# Patient Record
Sex: FEMALE | Race: WHITE | NOT HISPANIC OR LATINO
[De-identification: names, ages, dates, MRNs, and addresses within clinical notes are randomized per-mention and may not be internally consistent; named-entity substitution may affect disease eponyms.]

---

## 2022-05-05 ENCOUNTER — NON-APPOINTMENT (OUTPATIENT)
Age: 52
End: 2022-05-05

## 2022-05-06 PROBLEM — Z00.00 ENCOUNTER FOR PREVENTIVE HEALTH EXAMINATION: Status: ACTIVE | Noted: 2022-05-06

## 2022-05-10 ENCOUNTER — NON-APPOINTMENT (OUTPATIENT)
Age: 52
End: 2022-05-10

## 2022-05-10 ENCOUNTER — APPOINTMENT (OUTPATIENT)
Dept: GYNECOLOGIC ONCOLOGY | Facility: CLINIC | Age: 52
End: 2022-05-10
Payer: MEDICAID

## 2022-05-10 VITALS
HEIGHT: 63 IN | BODY MASS INDEX: 29.77 KG/M2 | DIASTOLIC BLOOD PRESSURE: 109 MMHG | HEART RATE: 96 BPM | WEIGHT: 168 LBS | SYSTOLIC BLOOD PRESSURE: 181 MMHG

## 2022-05-10 DIAGNOSIS — R10.9 UNSPECIFIED ABDOMINAL PAIN: ICD-10-CM

## 2022-05-10 DIAGNOSIS — Z78.9 OTHER SPECIFIED HEALTH STATUS: ICD-10-CM

## 2022-05-10 DIAGNOSIS — R10.30 LOWER ABDOMINAL PAIN, UNSPECIFIED: ICD-10-CM

## 2022-05-10 DIAGNOSIS — Z76.89 PERSONS ENCOUNTERING HEALTH SERVICES IN OTHER SPECIFIED CIRCUMSTANCES: ICD-10-CM

## 2022-05-10 DIAGNOSIS — Z01.818 ENCOUNTER FOR OTHER PREPROCEDURAL EXAMINATION: ICD-10-CM

## 2022-05-10 DIAGNOSIS — Z80.9 FAMILY HISTORY OF MALIGNANT NEOPLASM, UNSPECIFIED: ICD-10-CM

## 2022-05-10 PROCEDURE — 99205 OFFICE O/P NEW HI 60 MIN: CPT

## 2022-05-13 ENCOUNTER — OUTPATIENT (OUTPATIENT)
Dept: OUTPATIENT SERVICES | Facility: HOSPITAL | Age: 52
LOS: 1 days | End: 2022-05-13
Payer: MEDICAID

## 2022-05-13 VITALS
WEIGHT: 167.99 LBS | TEMPERATURE: 98 F | HEIGHT: 62 IN | OXYGEN SATURATION: 98 % | RESPIRATION RATE: 15 BRPM | DIASTOLIC BLOOD PRESSURE: 80 MMHG | HEART RATE: 88 BPM | SYSTOLIC BLOOD PRESSURE: 130 MMHG

## 2022-05-13 DIAGNOSIS — N94.89 OTHER SPECIFIED CONDITIONS ASSOCIATED WITH FEMALE GENITAL ORGANS AND MENSTRUAL CYCLE: ICD-10-CM

## 2022-05-13 DIAGNOSIS — F17.200 NICOTINE DEPENDENCE, UNSPECIFIED, UNCOMPLICATED: ICD-10-CM

## 2022-05-13 DIAGNOSIS — Z78.9 OTHER SPECIFIED HEALTH STATUS: Chronic | ICD-10-CM

## 2022-05-13 LAB
A1C WITH ESTIMATED AVERAGE GLUCOSE RESULT: 5.3 % — SIGNIFICANT CHANGE UP (ref 4–5.6)
ALBUMIN SERPL ELPH-MCNC: 4.3 G/DL — SIGNIFICANT CHANGE UP (ref 3.3–5)
ALP SERPL-CCNC: 90 U/L — SIGNIFICANT CHANGE UP (ref 40–120)
ALT FLD-CCNC: 39 U/L — HIGH (ref 4–33)
ANION GAP SERPL CALC-SCNC: 11 MMOL/L — SIGNIFICANT CHANGE UP (ref 7–14)
APPEARANCE UR: CLEAR — SIGNIFICANT CHANGE UP
AST SERPL-CCNC: 32 U/L — SIGNIFICANT CHANGE UP (ref 4–32)
BILIRUB SERPL-MCNC: 0.4 MG/DL — SIGNIFICANT CHANGE UP (ref 0.2–1.2)
BILIRUB UR-MCNC: NEGATIVE — SIGNIFICANT CHANGE UP
BLD GP AB SCN SERPL QL: NEGATIVE — SIGNIFICANT CHANGE UP
BUN SERPL-MCNC: 6 MG/DL — LOW (ref 7–23)
CALCIUM SERPL-MCNC: 9.2 MG/DL — SIGNIFICANT CHANGE UP (ref 8.4–10.5)
CHLORIDE SERPL-SCNC: 104 MMOL/L — SIGNIFICANT CHANGE UP (ref 98–107)
CO2 SERPL-SCNC: 24 MMOL/L — SIGNIFICANT CHANGE UP (ref 22–31)
COLOR SPEC: COLORLESS — SIGNIFICANT CHANGE UP
CREAT SERPL-MCNC: 0.78 MG/DL — SIGNIFICANT CHANGE UP (ref 0.5–1.3)
DIFF PNL FLD: NEGATIVE — SIGNIFICANT CHANGE UP
EGFR: 93 ML/MIN/1.73M2 — SIGNIFICANT CHANGE UP
ESTIMATED AVERAGE GLUCOSE: 105 — SIGNIFICANT CHANGE UP
GLUCOSE SERPL-MCNC: 78 MG/DL — SIGNIFICANT CHANGE UP (ref 70–99)
GLUCOSE UR QL: NEGATIVE — SIGNIFICANT CHANGE UP
HCG UR QL: NEGATIVE — SIGNIFICANT CHANGE UP
HCT VFR BLD CALC: 44.7 % — SIGNIFICANT CHANGE UP (ref 34.5–45)
HGB BLD-MCNC: 15.2 G/DL — SIGNIFICANT CHANGE UP (ref 11.5–15.5)
KETONES UR-MCNC: NEGATIVE — SIGNIFICANT CHANGE UP
LEUKOCYTE ESTERASE UR-ACNC: NEGATIVE — SIGNIFICANT CHANGE UP
MCHC RBC-ENTMCNC: 31.5 PG — SIGNIFICANT CHANGE UP (ref 27–34)
MCHC RBC-ENTMCNC: 34 GM/DL — SIGNIFICANT CHANGE UP (ref 32–36)
MCV RBC AUTO: 92.7 FL — SIGNIFICANT CHANGE UP (ref 80–100)
NITRITE UR-MCNC: NEGATIVE — SIGNIFICANT CHANGE UP
NRBC # BLD: 0 /100 WBCS — SIGNIFICANT CHANGE UP
NRBC # FLD: 0 K/UL — SIGNIFICANT CHANGE UP
PH UR: 6.5 — SIGNIFICANT CHANGE UP (ref 5–8)
PLATELET # BLD AUTO: 157 K/UL — SIGNIFICANT CHANGE UP (ref 150–400)
POTASSIUM SERPL-MCNC: 3.7 MMOL/L — SIGNIFICANT CHANGE UP (ref 3.5–5.3)
POTASSIUM SERPL-SCNC: 3.7 MMOL/L — SIGNIFICANT CHANGE UP (ref 3.5–5.3)
PROT SERPL-MCNC: 7.2 G/DL — SIGNIFICANT CHANGE UP (ref 6–8.3)
PROT UR-MCNC: NEGATIVE — SIGNIFICANT CHANGE UP
RBC # BLD: 4.82 M/UL — SIGNIFICANT CHANGE UP (ref 3.8–5.2)
RBC # FLD: 12.7 % — SIGNIFICANT CHANGE UP (ref 10.3–14.5)
RH IG SCN BLD-IMP: POSITIVE — SIGNIFICANT CHANGE UP
SODIUM SERPL-SCNC: 139 MMOL/L — SIGNIFICANT CHANGE UP (ref 135–145)
SP GR SPEC: 1 — SIGNIFICANT CHANGE UP (ref 1–1.05)
UROBILINOGEN FLD QL: SIGNIFICANT CHANGE UP
WBC # BLD: 9.26 K/UL — SIGNIFICANT CHANGE UP (ref 3.8–10.5)
WBC # FLD AUTO: 9.26 K/UL — SIGNIFICANT CHANGE UP (ref 3.8–10.5)

## 2022-05-13 PROCEDURE — 93010 ELECTROCARDIOGRAM REPORT: CPT

## 2022-05-13 NOTE — H&P PST ADULT - HISTORY OF PRESENT ILLNESS
51 year old female with no pertinent PMH, current smoker, presents to PST with pre op diagnosis of adnexal mass, for pre op evaluation prior to scheduled surgery- Robotic right salpingo oophorectomy, left salpingectomy, dilation curettage, possible left oophorectomy, Hysterectomy, staging, Laparotomy, cystoscopy with Dr Cano.

## 2022-05-13 NOTE — H&P PST ADULT - PROBLEM SELECTOR PLAN 1
Patient is tentatively scheduled for Robotic right salpingo oophorectomy, left salpingectomy, dilation curettage, possible left oophorectomy, Hysterectomy, staging, Laparotomy, cystoscopy with Dr Cano on 05/20/22.    Pre-op instructions provided. Pt given verbal and written instructions with teach back on chlorhexidine wash and pepcid. Pt verbalized understanding with return demonstration.    Pt strongly advised to follow up with surgeon to discuss COVID testing requirements prior to procedure.

## 2022-05-13 NOTE — H&P PST ADULT - NSICDXFAMILYHX_GEN_ALL_CORE_FT
FAMILY HISTORY:  Mother  Still living? Unknown  Family history of skin cancer, Age at diagnosis: Age Unknown    Sibling  Still living? Yes, Estimated age: Age Unknown  Family history of skin cancer, Age at diagnosis: Age Unknown

## 2022-05-13 NOTE — H&P PST ADULT - ASSESSMENT
pre op diagnosis of adnexal mass, for pre op evaluation prior to scheduled surgery- Robotic right salpingo oophorectomy, left salpingectomy, dilation curettage, possible left oophorectomy, Hysterectomy, staging, Laparotomy, cystoscopy with Dr Cano.

## 2022-05-13 NOTE — H&P PST ADULT - NEGATIVE ENMT SYMPTOMS
no hearing difficulty/no ear pain/no tinnitus/no vertigo/no sinus symptoms/no nasal congestion/no post-nasal discharge

## 2022-05-14 LAB
CULTURE RESULTS: SIGNIFICANT CHANGE UP
SPECIMEN SOURCE: SIGNIFICANT CHANGE UP

## 2022-05-17 ENCOUNTER — RESULT REVIEW (OUTPATIENT)
Age: 52
End: 2022-05-17

## 2022-05-18 ENCOUNTER — OUTPATIENT (OUTPATIENT)
Dept: OUTPATIENT SERVICES | Facility: HOSPITAL | Age: 52
LOS: 1 days | End: 2022-05-18
Payer: SELF-PAY

## 2022-05-18 ENCOUNTER — APPOINTMENT (OUTPATIENT)
Dept: CT IMAGING | Facility: CLINIC | Age: 52
End: 2022-05-18
Payer: SELF-PAY

## 2022-05-18 DIAGNOSIS — R10.9 UNSPECIFIED ABDOMINAL PAIN: ICD-10-CM

## 2022-05-18 DIAGNOSIS — N94.89 OTHER SPECIFIED CONDITIONS ASSOCIATED WITH FEMALE GENITAL ORGANS AND MENSTRUAL CYCLE: ICD-10-CM

## 2022-05-18 DIAGNOSIS — Z78.9 OTHER SPECIFIED HEALTH STATUS: Chronic | ICD-10-CM

## 2022-05-18 PROBLEM — Z87.09 PERSONAL HISTORY OF OTHER DISEASES OF THE RESPIRATORY SYSTEM: Chronic | Status: ACTIVE | Noted: 2022-05-13

## 2022-05-18 PROBLEM — F17.200 NICOTINE DEPENDENCE, UNSPECIFIED, UNCOMPLICATED: Chronic | Status: ACTIVE | Noted: 2022-05-13

## 2022-05-18 LAB — SARS-COV-2 N GENE NPH QL NAA+PROBE: NOT DETECTED

## 2022-05-18 PROCEDURE — 74160 CT ABDOMEN W/CONTRAST: CPT

## 2022-05-18 PROCEDURE — 74160 CT ABDOMEN W/CONTRAST: CPT | Mod: 26

## 2022-05-19 ENCOUNTER — TRANSCRIPTION ENCOUNTER (OUTPATIENT)
Age: 52
End: 2022-05-19

## 2022-05-20 ENCOUNTER — RESULT REVIEW (OUTPATIENT)
Age: 52
End: 2022-05-20

## 2022-05-20 ENCOUNTER — APPOINTMENT (OUTPATIENT)
Dept: GYNECOLOGIC ONCOLOGY | Facility: HOSPITAL | Age: 52
End: 2022-05-20

## 2022-05-20 ENCOUNTER — TRANSCRIPTION ENCOUNTER (OUTPATIENT)
Age: 52
End: 2022-05-20

## 2022-05-20 ENCOUNTER — OUTPATIENT (OUTPATIENT)
Dept: OUTPATIENT SERVICES | Facility: HOSPITAL | Age: 52
LOS: 1 days | Discharge: HOME | End: 2022-05-20
Payer: MEDICAID

## 2022-05-20 VITALS
OXYGEN SATURATION: 96 % | SYSTOLIC BLOOD PRESSURE: 155 MMHG | HEART RATE: 78 BPM | RESPIRATION RATE: 18 BRPM | TEMPERATURE: 98 F | WEIGHT: 167.99 LBS | DIASTOLIC BLOOD PRESSURE: 66 MMHG | HEIGHT: 62 IN

## 2022-05-20 VITALS
OXYGEN SATURATION: 95 % | RESPIRATION RATE: 16 BRPM | SYSTOLIC BLOOD PRESSURE: 126 MMHG | HEART RATE: 87 BPM | DIASTOLIC BLOOD PRESSURE: 71 MMHG | TEMPERATURE: 100 F

## 2022-05-20 DIAGNOSIS — N94.89 OTHER SPECIFIED CONDITIONS ASSOCIATED WITH FEMALE GENITAL ORGANS AND MENSTRUAL CYCLE: ICD-10-CM

## 2022-05-20 DIAGNOSIS — Z78.9 OTHER SPECIFIED HEALTH STATUS: Chronic | ICD-10-CM

## 2022-05-20 LAB — HCG UR QL: NEGATIVE — SIGNIFICANT CHANGE UP

## 2022-05-20 PROCEDURE — 58661 LAPAROSCOPY REMOVE ADNEXA: CPT

## 2022-05-20 PROCEDURE — 88305 TISSUE EXAM BY PATHOLOGIST: CPT | Mod: 26

## 2022-05-20 PROCEDURE — S2900 ROBOTIC SURGICAL SYSTEM: CPT | Mod: NC

## 2022-05-20 PROCEDURE — 88305 TISSUE EXAM BY PATHOLOGIST: CPT | Mod: 26,59

## 2022-05-20 PROCEDURE — 88331 PATH CONSLTJ SURG 1 BLK 1SPC: CPT | Mod: 26

## 2022-05-20 PROCEDURE — 58120 DILATION AND CURETTAGE: CPT

## 2022-05-20 PROCEDURE — 88307 TISSUE EXAM BY PATHOLOGIST: CPT | Mod: 26

## 2022-05-20 PROCEDURE — 88112 CYTOPATH CELL ENHANCE TECH: CPT | Mod: 26

## 2022-05-20 RX ORDER — FENTANYL CITRATE 50 UG/ML
25 INJECTION INTRAVENOUS
Refills: 0 | Status: DISCONTINUED | OUTPATIENT
Start: 2022-05-20 | End: 2022-05-20

## 2022-05-20 RX ORDER — OXYCODONE HYDROCHLORIDE 5 MG/1
10 TABLET ORAL ONCE
Refills: 0 | Status: DISCONTINUED | OUTPATIENT
Start: 2022-05-20 | End: 2022-05-20

## 2022-05-20 RX ORDER — ONDANSETRON 8 MG/1
4 TABLET, FILM COATED ORAL ONCE
Refills: 0 | Status: DISCONTINUED | OUTPATIENT
Start: 2022-05-20 | End: 2022-06-03

## 2022-05-20 RX ORDER — FENTANYL CITRATE 50 UG/ML
50 INJECTION INTRAVENOUS
Refills: 0 | Status: DISCONTINUED | OUTPATIENT
Start: 2022-05-20 | End: 2022-05-20

## 2022-05-20 RX ORDER — OXYCODONE HYDROCHLORIDE 5 MG/1
5 TABLET ORAL ONCE
Refills: 0 | Status: DISCONTINUED | OUTPATIENT
Start: 2022-05-20 | End: 2022-05-20

## 2022-05-20 RX ORDER — OXYCODONE HYDROCHLORIDE 5 MG/1
1 TABLET ORAL
Qty: 5 | Refills: 0
Start: 2022-05-20

## 2022-05-20 RX ORDER — SODIUM CHLORIDE 9 MG/ML
1000 INJECTION, SOLUTION INTRAVENOUS
Refills: 0 | Status: DISCONTINUED | OUTPATIENT
Start: 2022-05-20 | End: 2022-06-03

## 2022-05-20 RX ADMIN — SODIUM CHLORIDE 125 MILLILITER(S): 9 INJECTION, SOLUTION INTRAVENOUS at 12:39

## 2022-05-20 NOTE — ASU DISCHARGE PLAN (ADULT/PEDIATRIC) - NURSING INSTRUCTIONS
You received IV Tylenol for pain management at 10:00AM. Please DO NOT take any Tylenol (Acetaminophen) containing products, such as Vicodin, Percocet, Excedrin, and cold medications for the next 6 hours (until 4:00PMPM). DO NOT TAKE MORE THAN 3000 MG OF TYLENOL in a 24 hour period. You received IV Toradol for pain management at 12:00PM. Please DO NOT take Motrin/Ibuprofen/Advil/Aleve/NSAIDs (Non-Steroidal Anti-Inflammatory Drugs) for the next 6 hours (until 6:00PM).

## 2022-05-20 NOTE — BRIEF OPERATIVE NOTE - NSICDXBRIEFPROCEDURE_GEN_ALL_CORE_FT
PROCEDURES:  Robot-assisted bilateral salpingo-oophorectomy 20-May-2022 12:19:12  Frankel, Robyn D&ESTELA (dilatation and curettage, scraping of uterus) 20-May-2022 12:19:20  Frankel, Robyn

## 2022-05-20 NOTE — PROGRESS NOTE ADULT - SUBJECTIVE AND OBJECTIVE BOX
GYNECOLOGIC ONCOLOGY PA POST OP  NOTE    Pt seen and examined at bedside. Pain well-controlled. Patient denies headache, dizziness, nausea, vomiting, chest pain and sob. Patient is tolerating sips of water. Hanks removed in OR. Nurse endorses that patient had an episode of incontinence upon first arriving in PACU.     OBJECTIVE:     VITALS:  T(F): 98.1 (05-20-22 @ 12:15), Max: 98.1 (05-20-22 @ 12:15)  HR: 93 (05-20-22 @ 13:15) (78 - 94)  BP: 117/75 (05-20-22 @ 13:15) (104/61 - 155/66)  RR: 15 (05-20-22 @ 13:15) (12 - 18)  SpO2: 96% (05-20-22 @ 13:15) (95% - 97%)  Wt(kg): --    I&O's Summary    20 May 2022 07:01  -  20 May 2022 13:36  --------------------------------------------------------  IN: 300 mL / OUT: 1 mL / NET: 299 mL        MEDICATIONS  (STANDING):  lactated ringers. 1000 milliLiter(s) (125 mL/Hr) IV Continuous <Continuous>    MEDICATIONS  (PRN):  fentaNYL    Injectable 25 MICROGram(s) IV Push every 5 minutes PRN Moderate Pain (4 - 6)  fentaNYL    Injectable 50 MICROGram(s) IV Push every 5 minutes PRN Severe Pain (7 - 10)  ondansetron Injectable 4 milliGRAM(s) IV Push once PRN Nausea and/or Vomiting  oxyCODONE    IR 5 milliGRAM(s) Oral once PRN Moderate Pain (4 - 6)  oxyCODONE    IR 10 milliGRAM(s) Oral once PRN Severe Pain (7 - 10)      Physical Exam:  Constitutional: NAD  Pulmonary: clear to auscultation bilaterally   Cardiovascular: Regular rate and rhythm   Abdomen: soft, non-distended, appropriately tender, scope sites C/D/I  Extremities: no lower extremity edema or calve tenderness

## 2022-05-20 NOTE — ASU DISCHARGE PLAN (ADULT/PEDIATRIC) - NS MD DC FALL RISK RISK
For information on Fall & Injury Prevention, visit: [email or web address] OR  [email or web address] OR  https://www.cdc.gov/steadi/patient.html

## 2022-05-20 NOTE — ASU PREOP CHECKLIST - SELECT TESTS ORDERED
CBC/Type and Cross/Type and Screen/UCG/COVID-19 fs 110/CBC/Type and Cross/Type and Screen/UCG/POCT Blood Glucose/COVID-19

## 2022-05-20 NOTE — ASU DISCHARGE PLAN (ADULT/PEDIATRIC) - CARE PROVIDER_API CALL
Em Cano)  Gynecologic Oncology; Obstetrics and Gynecology  [address] [place] [address]  Phone: [phone]  Fax: [phone]  Established Patient  Follow Up Time: 2 weeks

## 2022-05-20 NOTE — PROGRESS NOTE ADULT - ASSESSMENT
52 yo female s/p Robot-assisted bilateral salpingo-oophorectomy and D&C (frozen: benign) in stable condition  -LR@125  -Regular diet  -DTV @7pm  -pain management prn  -VS per routine  - discharge home once ambulating and voiding without difficulty, tolerating po diet  -discharge instructions reviewed  -F/U with Dr. Cano in 2 weeks    Titi CASEYC  #[id number]

## 2022-05-20 NOTE — BRIEF OPERATIVE NOTE - OPERATION/FINDINGS
EUA: normal external genitalia; Small mobile uterus. Right mobile adnexal mass palpated in posterior cul de sac.   Lsc: Atraumatic entry sites. 6cm fibrous appearing right ovarian mass and multiple 2-3cm fibrous appearing left ovarian masses encompassing entirety of normal ovarian tissue with filmy adhesions to uterosacral ligaments on left. Grossly normal appearing uterus and bilateral fallopian tubes. Abdominopelvic survey wnl, include liver, stomach, large and small bowel, appendix, omentum, bladder.

## 2022-05-23 ENCOUNTER — NON-APPOINTMENT (OUTPATIENT)
Age: 52
End: 2022-05-23

## 2022-05-23 LAB — GLUCOSE BLDC GLUCOMTR-MCNC: 110 MG/DL — HIGH (ref 70–99)

## 2022-05-24 ENCOUNTER — NON-APPOINTMENT (OUTPATIENT)
Age: 52
End: 2022-05-24

## 2022-05-24 LAB — NON-GYNECOLOGICAL CYTOLOGY STUDY: SIGNIFICANT CHANGE UP

## 2022-05-31 ENCOUNTER — APPOINTMENT (OUTPATIENT)
Dept: GYNECOLOGIC ONCOLOGY | Facility: CLINIC | Age: 52
End: 2022-05-31
Payer: MEDICAID

## 2022-05-31 VITALS — HEART RATE: 92 BPM | SYSTOLIC BLOOD PRESSURE: 147 MMHG | DIASTOLIC BLOOD PRESSURE: 107 MMHG

## 2022-05-31 DIAGNOSIS — N94.89 OTHER SPECIFIED CONDITIONS ASSOCIATED WITH FEMALE GENITAL ORGANS AND MENSTRUAL CYCLE: ICD-10-CM

## 2022-05-31 LAB — SURGICAL PATHOLOGY STUDY: SIGNIFICANT CHANGE UP

## 2022-05-31 PROCEDURE — 99024 POSTOP FOLLOW-UP VISIT: CPT

## 2022-05-31 RX ORDER — OXYCODONE 5 MG/1
5 TABLET ORAL
Qty: 5 | Refills: 0 | Status: COMPLETED | COMMUNITY
Start: 2022-05-20

## 2023-10-26 ENCOUNTER — APPOINTMENT (OUTPATIENT)
Dept: THORACIC SURGERY | Facility: CLINIC | Age: 53
End: 2023-10-26
Payer: MEDICAID

## 2023-10-26 VITALS
BODY MASS INDEX: 30.48 KG/M2 | TEMPERATURE: 97.9 F | OXYGEN SATURATION: 95 % | SYSTOLIC BLOOD PRESSURE: 144 MMHG | HEART RATE: 86 BPM | RESPIRATION RATE: 16 BRPM | DIASTOLIC BLOOD PRESSURE: 83 MMHG | HEIGHT: 63 IN | WEIGHT: 172 LBS

## 2023-10-26 PROCEDURE — 99244 OFF/OP CNSLTJ NEW/EST MOD 40: CPT

## 2023-10-26 RX ORDER — LOSARTAN POTASSIUM 50 MG/1
50 TABLET, FILM COATED ORAL
Refills: 0 | Status: ACTIVE | COMMUNITY

## 2023-10-26 RX ORDER — ROSUVASTATIN CALCIUM 40 MG/1
40 TABLET, FILM COATED ORAL
Refills: 0 | Status: ACTIVE | COMMUNITY

## 2023-11-14 ENCOUNTER — TRANSCRIPTION ENCOUNTER (OUTPATIENT)
Age: 53
End: 2023-11-14

## 2023-11-15 ENCOUNTER — APPOINTMENT (OUTPATIENT)
Dept: THORACIC SURGERY | Facility: HOSPITAL | Age: 53
End: 2023-11-15

## 2023-11-15 ENCOUNTER — OUTPATIENT (OUTPATIENT)
Dept: OUTPATIENT SERVICES | Facility: HOSPITAL | Age: 53
LOS: 1 days | End: 2023-11-15
Payer: MEDICAID

## 2023-11-15 ENCOUNTER — RESULT REVIEW (OUTPATIENT)
Age: 53
End: 2023-11-15

## 2023-11-15 DIAGNOSIS — K92 OTHER DISEASES OF DIGESTIVE SYSTEM: ICD-10-CM

## 2023-11-15 DIAGNOSIS — Z78.9 OTHER SPECIFIED HEALTH STATUS: Chronic | ICD-10-CM

## 2023-11-15 PROCEDURE — 88341 IMHCHEM/IMCYTCHM EA ADD ANTB: CPT | Mod: 26

## 2023-11-15 PROCEDURE — 88341 IMHCHEM/IMCYTCHM EA ADD ANTB: CPT

## 2023-11-15 PROCEDURE — 31653 BRONCH EBUS SAMPLNG 3/> NODE: CPT

## 2023-11-15 PROCEDURE — 88112 CYTOPATH CELL ENHANCE TECH: CPT | Mod: 26,59

## 2023-11-15 PROCEDURE — 88360 TUMOR IMMUNOHISTOCHEM/MANUAL: CPT

## 2023-11-15 PROCEDURE — 31654 BRONCH EBUS IVNTJ PERPH LES: CPT

## 2023-11-15 PROCEDURE — 88305 TISSUE EXAM BY PATHOLOGIST: CPT | Mod: 26

## 2023-11-15 PROCEDURE — 71045 X-RAY EXAM CHEST 1 VIEW: CPT

## 2023-11-15 PROCEDURE — 71045 X-RAY EXAM CHEST 1 VIEW: CPT | Mod: 26

## 2023-11-15 PROCEDURE — 88173 CYTOPATH EVAL FNA REPORT: CPT

## 2023-11-15 PROCEDURE — 88112 CYTOPATH CELL ENHANCE TECH: CPT

## 2023-11-15 PROCEDURE — 88342 IMHCHEM/IMCYTCHM 1ST ANTB: CPT

## 2023-11-15 PROCEDURE — 88104 CYTOPATH FL NONGYN SMEARS: CPT

## 2023-11-15 PROCEDURE — 76000 FLUOROSCOPY <1 HR PHYS/QHP: CPT

## 2023-11-15 PROCEDURE — 88173 CYTOPATH EVAL FNA REPORT: CPT | Mod: 26,59

## 2023-11-15 PROCEDURE — 31652 BRONCH EBUS SAMPLNG 1/2 NODE: CPT

## 2023-11-15 PROCEDURE — 88104 CYTOPATH FL NONGYN SMEARS: CPT | Mod: 26,59

## 2023-11-15 PROCEDURE — 88305 TISSUE EXAM BY PATHOLOGIST: CPT

## 2023-11-15 PROCEDURE — S2900 ROBOTIC SURGICAL SYSTEM: CPT | Mod: NC

## 2023-11-15 PROCEDURE — 31624 DX BRONCHOSCOPE/LAVAGE: CPT | Mod: LT

## 2023-11-15 PROCEDURE — 31628 BRONCHOSCOPY/LUNG BX EACH: CPT

## 2023-11-15 PROCEDURE — 31625 BRONCHOSCOPY W/BIOPSY(S): CPT | Mod: LT

## 2023-11-15 PROCEDURE — C9399: CPT

## 2023-11-15 PROCEDURE — 88360 TUMOR IMMUNOHISTOCHEM/MANUAL: CPT | Mod: 26

## 2023-11-15 PROCEDURE — 88342 IMHCHEM/IMCYTCHM 1ST ANTB: CPT | Mod: 26

## 2023-11-15 PROCEDURE — 31627 NAVIGATIONAL BRONCHOSCOPY: CPT

## 2023-11-15 NOTE — BRIEF OPERATIVE NOTE - NSICDXBRIEFPROCEDURE_GEN_ALL_CORE_FT
PROCEDURES:  Biopsy, lung, robot-assisted, bronchoscopic, using Ion endoluminal system 15-Nov-2023 13:04:21 ION Bronchoscopy, Multiple Biopsies of left lower lung , EBUS with multiple Lymph Node Biopsies Garrick Ling

## 2023-11-15 NOTE — BRIEF OPERATIVE NOTE - SPECIMENS
Nursing Transfer Note      6/19/2018     Transfer to 511    Transfer via stretcher    Transfer with polar ice/iv and sapphire pumps    Transported by pct    Medicines sent: n/a    Chart send with patient: yes    Notified:     Patient reassessed at: 6/19/18 @ 9815   Multiple Left Lower lobe Mass & Multiple Lymph Node Biopsies

## 2023-11-27 LAB
NON-GYNECOLOGICAL CYTOLOGY STUDY: SIGNIFICANT CHANGE UP
NON-GYNECOLOGICAL CYTOLOGY STUDY: SIGNIFICANT CHANGE UP

## 2023-11-30 ENCOUNTER — APPOINTMENT (OUTPATIENT)
Dept: THORACIC SURGERY | Facility: CLINIC | Age: 53
End: 2023-11-30
Payer: MEDICAID

## 2023-11-30 VITALS
TEMPERATURE: 97.9 F | SYSTOLIC BLOOD PRESSURE: 134 MMHG | HEIGHT: 63 IN | WEIGHT: 174 LBS | DIASTOLIC BLOOD PRESSURE: 87 MMHG | RESPIRATION RATE: 16 BRPM | BODY MASS INDEX: 30.83 KG/M2 | OXYGEN SATURATION: 95 % | HEART RATE: 86 BPM

## 2023-11-30 PROCEDURE — 99213 OFFICE O/P EST LOW 20 MIN: CPT

## 2024-01-25 ENCOUNTER — APPOINTMENT (OUTPATIENT)
Dept: THORACIC SURGERY | Facility: HOSPITAL | Age: 54
End: 2024-01-25
Payer: MEDICAID

## 2024-02-08 ENCOUNTER — APPOINTMENT (OUTPATIENT)
Dept: THORACIC SURGERY | Facility: CLINIC | Age: 54
End: 2024-02-08
Payer: MEDICAID

## 2024-02-08 VITALS
DIASTOLIC BLOOD PRESSURE: 76 MMHG | HEART RATE: 110 BPM | OXYGEN SATURATION: 98 % | SYSTOLIC BLOOD PRESSURE: 148 MMHG | RESPIRATION RATE: 16 BRPM

## 2024-02-08 PROCEDURE — 99212 OFFICE O/P EST SF 10 MIN: CPT

## 2024-02-09 NOTE — DATA REVIEWED
[FreeTextEntry1] : Collected Date/Time:                   11/15/2023 17:05 EST Received Date/Time:                    11/15/2023 17:05 EST  Fine Needle Aspiration Addendum Report - Auth (Verified)  Addendum Reporting results of immunostains. RESULT : AE1.3, P40 are positive and TTF1 is negative. These findings are in support of the initial diagnosis  Slide(s) with built in immunohistochemical study control(s) associated and negative control with this case has/have been verified by the sign out pathologist. For slide(s) without built in controls positive control slides has/have  been reviewed and approved by immunohistochemistry lab These immunohistochemical/ in-situ hybridization tests have been developed and their performance characteristics determined by [facility], Department of Pathology, Division of Immunopathology, [address] [place] [address].  It has not been cleared or approved by the U.S. Food and Drug Administration. The FDA has determined that such clearance or approval is not necessary. This test is used for clinical purposes.  The laboratory is certified under the CLIA-88 as qualified to perform high complexity clinical testing.

## 2024-02-09 NOTE — HISTORY OF PRESENT ILLNESS
[FreeTextEntry1] : Ms. NORBERT MATOS is a 53 year female who presents today for follow-up. Previously, she has been followed for lung nodules which were found to be metastatic squamous cell carcinoma and was referred to oncology. Treatment to date has included neoadjuvant chemoimmunotherapy, which was initiated on 12/29/2023. She presents today for clinical progress follow up. Additional past medical history includes right ovary removal, current smoker 4 cigarettes a day, HTN and HLD.   Today, the patient reports feeling overall well and continues to smoke 4 cigarettes a day. Denies and cough, shortness of breath, fever, chills, unintentional weight loss/gain, and night sweats. [facility] She just finished her second dose of chemo and goes for her third tomorrow.      Referring: Jessica

## 2024-02-09 NOTE — REVIEW OF SYSTEMS
[Feeling Poorly] : feeling poorly [Feeling Tired] : feeling tired [Negative] : Heme/Lymph [Chest Pain] : no chest pain [Palpitations] : no palpitations [Shortness Of Breath] : no shortness of breath [SOB on Exertion] : no shortness of breath during exertion

## 2024-02-09 NOTE — ASSESSMENT
[FreeTextEntry1] : Ms. NORBERT MATOS is a 53 year female who presents today for follow-up. Previously, she has been followed for lung nodules which were found to be metastatic squamous cell carcinoma and was referred to oncology. Treatment to date has included neoadjuvant chemoimmunotherapy, which was initiated on 12/29/2023. She presents today for clinical progress follow up. Additional past medical history includes right ovary removal, current smoker 4 cigarettes a day, HTN and HLD.  Overall she is improving and denies any discomfort. She is following up her care with Dr Ortiz for Oncology and has gone through 3 cycles of treatment. Overall she is well and states she is mildly fatigued and has begun to lose her hair from therapies. She will be continuing to follow up with Oncology and return to care in 2 months to discuss her clinical progress. She is agreeable and will follow up accordingly.  PLAN: - Continue care with Oncology  - Return to care in 2 months     I, Dr. Medeiros personally performed the evaluation and management (E/M) services for this patient. That E/M includes conducting the initial examination, assessing all conditions, and establishing the plan of care. Today, Manjinder Gerard NP was here to observe my evaluation and management services for this patient.

## 2024-02-09 NOTE — PHYSICAL EXAM
[Sclera] : the sclera and conjunctiva were normal [Neck Appearance] : the appearance of the neck was normal [Respiration, Rhythm And Depth] : normal respiratory rhythm and effort [Heart Rate And Rhythm] : heart rate was normal and rhythm regular [Examination Of The Chest] : the chest was normal in appearance [Abnormal Walk] : normal gait [Skin Color & Pigmentation] : normal skin color and pigmentation [Sensation] : the sensory exam was normal to light touch and pinprick [Oriented To Time, Place, And Person] : oriented to person, place, and time

## 2024-04-04 ENCOUNTER — APPOINTMENT (OUTPATIENT)
Dept: THORACIC SURGERY | Facility: CLINIC | Age: 54
End: 2024-04-04
Payer: MEDICAID

## 2024-04-04 VITALS
SYSTOLIC BLOOD PRESSURE: 134 MMHG | WEIGHT: 180 LBS | DIASTOLIC BLOOD PRESSURE: 81 MMHG | BODY MASS INDEX: 31.89 KG/M2 | RESPIRATION RATE: 16 BRPM | HEIGHT: 63 IN | TEMPERATURE: 97.4 F | HEART RATE: 97 BPM | OXYGEN SATURATION: 97 %

## 2024-04-04 PROCEDURE — 99214 OFFICE O/P EST MOD 30 MIN: CPT

## 2024-04-04 NOTE — DATA REVIEWED
[FreeTextEntry1] : [facility] - PET scan performed on 2/21/24: IMPRESSION: 1. No evidence of FDG avid malignant disease 2. Interval decrease in size of the nodule in the left lower lobe which is currently nonavid. Interval resolution of previously seen hypermetabolic foci in the hilar and mediastinal mago stations where currently activity is less than or similar to physiologic mediastinal blood pool activity. Stable subcentimeter pulmonary nodular densities which are below the resolution of PET. Attention to follow up for the former findings.       Fine Needle Aspiration Report - Auth (Verified) Specimen(s) Submitted 1. LYMPH NODE, SUBCARINAL, EBUS-GUIDED FNA 2. PARATRACHEAL, LEFT, ENDOBRONCHIAL ULTRASOUND-GUIDED FNA 3. LUNG, BRONCHOALVEOLAR LAVAGE   Final Diagnosis 1. LYMPH NODE, SUBCARINAL, EBUS-GUIDED FNA POSITIVE FOR MALIGNANT CELLS. Non small cell carcinoma favor squamous cell carcinoma  The cell block consists of   disorganized crowded groups  and clusters and single-lying atypical cells displaying anisonucleosis, irregular chromatin, and prominent  nucleoli.   The slide consists of reactive bronchial cells, alveolar macrophages and mixed inflammatory cells. Immunohistochemistry:   The neoplastic cells are positive for P40 and negative for TTF-1 supporting the above diagnosis.  2. PARATRACHEAL, LEFT, ENDOBRONCHIAL ULTRASOUND-GUIDED FNA SUSPICIOUS FOR MALIGNANT CELLS  Hypocellular specimen and cell block composed of scattered groups of atypical cells with enlarged nuclei containing prominent nucleoli. Paucity of cells precludes a mre definitive diagnosis. Immunohistochemistry:   Rare cells are positive for P40 and negative for TTF-1.  3. LUNG, BRONCHOALVEOLAR LAVAGE ATYPICAL FINDINGS. The slide shows rare atypical cells , benign bromchial epithelial cells and macrophages.. Cell block consists of reactive bronchial cells, alveolar macrophages and mixed inflammatory cells.  Please also refer to specimen number: [id number]  Case reported to Tumor Registry. This case was reviewed as an intradepartmental consultation with staff cytopathologists who concur with the diagnosis on    11/27/2023  Report faxed to Dr. Medeiros's office on 11/27/2023   Slide(s) with built in immunohistochemical study control(s) and negative control associated with this case has/have been verified by the sign out pathologist.  For slide(s) without built in controls positive control slides has/have been reviewed and approved by immunohistochemistry lab These immunohistochemical/ in-situ hybridization tests have been developed and their performance characteristics determined by [facility], Department of Pathology, Division of Immunopathology, [phone] [place] [address], [place] [address].  It has not been cleared or approved by the U.S. Food and Drug Administration. The FDA has determined that such clearance or approval is not necessary. This test is used for clinical purposes.  The laboratory is certified under the CLIA-88 as qualified to perform high complexity clinical testing.  Screened by: Rickey GARCIA(ASCP) Verified by: Chelo Bui MD (Electronic Signature) Reported on: 11/27/23 15:03 EST

## 2024-04-04 NOTE — ASSESSMENT
[FreeTextEntry1] : I had the pleasure of seeing Ms. NORBERT MATOS in the office today for surgical evaluation.   Briefly, this is a 53 year  old female with a history of hypertension and hyperlipidemia previously found to have stage 3 squamous cell carcinoma of the lung.    We discussed the PET scan results. Ms. Matos had a fantastic response to the chemotherapy. The size of the left lower lobe nodule has decreased and is currently nonavid on PET scan. This will be amenable to surgical excision at this point.  Patient was counseled on the importance of smoking cessation and strategies were discussed to help her achieve a smoke-free lifestyle.    The planned procedure, hospital stay and recovery was discussed in detail. All risks, benefits and alternatives were discussed at length with the patient. All questions addressed. The patient fully understood and would like to proceed with surgical intervention as discussed.   Preoperative checklist: - Anticoagulation/antiplatelets (X) noted to be discontinued X days before surgery NONE - SGLT-2 Inhibitors noted to be discontinued 3 days before surgery NONE - Pacemaker confirmed: NONE - Allergies confirmed, including latex NKDA   Surgical plan: - Pulmonary function testing - Medical clearance prior to surgery - Presurgical testing - Robotic assisted left lower lobectomy with lymph node sampling with possible thoracotomy   I, Dr. Franck Medeiros, personally performed the evaluation and management (E/M) services for this established patient who presents today with an existing condition.  That E/M includes conducting the examination, assessing all conditions, and establishing a new plan of care.  Today, Ramonita Fine PA-C, was here to observe my evaluation and management services for this/these condition(s) to be followed going forward.

## 2024-04-04 NOTE — PHYSICAL EXAM
[PERRL With Normal Accommodation] : pupils were equal in size, round, and reactive to light [Sclera] : the sclera and conjunctiva were normal [Neck Appearance] : the appearance of the neck was normal [] : the neck was supple [Neck Cervical Mass (___cm)] : no neck mass was observed [Respiration, Rhythm And Depth] : normal respiratory rhythm and effort [Auscultation Breath Sounds / Voice Sounds] : lungs were clear to auscultation bilaterally [Heart Rate And Rhythm] : heart rate was normal and rhythm regular [Heart Sounds] : normal S1 and S2 [2+] : left 2+ [Bowel Sounds] : normal bowel sounds [Abdomen Soft] : soft [Cervical Lymph Nodes Enlarged Posterior Bilaterally] : posterior cervical [Supraclavicular Lymph Nodes Enlarged Bilaterally] : supraclavicular [Cervical Lymph Nodes Enlarged Anterior Bilaterally] : anterior cervical [Abnormal Walk] : normal gait [Skin Color & Pigmentation] : normal skin color and pigmentation [Skin Turgor] : normal skin turgor [No Focal Deficits] : no focal deficits [Oriented To Time, Place, And Person] : oriented to person, place, and time [Impaired Insight] : insight and judgment were intact [Affect] : the affect was normal [Mood] : the mood was normal [Memory Recent] : recent memory was not impaired [Memory Remote] : remote memory was not impaired [FreeTextEntry2] : trace b/l edema

## 2024-04-04 NOTE — HISTORY OF PRESENT ILLNESS
[FreeTextEntry1] : Ms. NORBERT MATOS is a 53-year female who presents today for follow-up. Previously, she has been followed for lung nodules which were found to be metastatic squamous cell carcinoma and was referred to oncology. Treatment to date has included neoadjuvant chemoimmunotherapy, which was initiated on 12/29/2023. She presents today for clinical progress follow up and review recent PET imaging.  Additional past medical history includes right ovary removal, current smoker 4 cigarettes a day, HTN and HLD.  Today she reports having a dry mouth but otherwise is feeling well. Her last chemotherapy treatment was a couple months ago and presents today for surgical consult and then will plan to do immunotherapy.  Referring: [facility] Oncology: Dr Fox at [facility]

## 2024-04-11 ENCOUNTER — APPOINTMENT (OUTPATIENT)
Dept: PULMONOLOGY | Facility: CLINIC | Age: 54
End: 2024-04-11
Payer: MEDICAID

## 2024-04-11 VITALS
HEIGHT: 63 IN | OXYGEN SATURATION: 97 % | RESPIRATION RATE: 16 BRPM | SYSTOLIC BLOOD PRESSURE: 116 MMHG | WEIGHT: 180 LBS | BODY MASS INDEX: 31.89 KG/M2 | DIASTOLIC BLOOD PRESSURE: 84 MMHG | HEART RATE: 114 BPM

## 2024-04-11 DIAGNOSIS — J44.9 CHRONIC OBSTRUCTIVE PULMONARY DISEASE, UNSPECIFIED: ICD-10-CM

## 2024-04-11 PROCEDURE — 99406 BEHAV CHNG SMOKING 3-10 MIN: CPT

## 2024-04-11 PROCEDURE — 99204 OFFICE O/P NEW MOD 45 MIN: CPT | Mod: 25

## 2024-04-11 PROCEDURE — G2211 COMPLEX E/M VISIT ADD ON: CPT | Mod: NC,1L

## 2024-04-11 RX ORDER — UMECLIDINIUM BROMIDE AND VILANTEROL TRIFENATATE 62.5; 25 UG/1; UG/1
62.5-25 POWDER RESPIRATORY (INHALATION)
Qty: 1 | Refills: 5 | Status: ACTIVE | COMMUNITY
Start: 2024-04-11 | End: 1900-01-01

## 2024-04-11 NOTE — RESULTS/DATA
[TextEntry] : [facility] Exam requested by: JESUS DOHERTY [facility] [address] [facility] [address] SITE PERFORMED: [facility] PHONE: [phone] Patient: NORBERT MATOS YOB: 1970 Phone: [phone] MRN: [id number] Acc: [id number] Date of Exam: 11-   EXAM:  PET CT TUMOR IMAGING SKULL-THIGH  Note - This patient has received 2 CT studies and 0 Myocardial Perfusion studies within our network over the previous 12 month period.  HISTORY: 53-year-old female with suspiciously enlarging left lower lobe lung mass on screening CT imaging.  PET/CT REQUESTED FOR: Initial treatment strategy.  TECHNIQUE: Approximately 70 minutes following intravenous administration of 12.4 mCi of 18-FDG via the right antecubital vein, PET/CT imaging was performed performed from the cranial vertex to the thighs. At the time of injection, the patient's blood glucose level was 110 mg/dL. Prior to the imaging, a CT examination of the head, chest, abdomen, and pelvis was acquired following administration of 90 cc from a vial of 100 cc Omnipaque 350 intravenous contrast, without untoward reaction.   Multiplanar reconstructed images were reviewed. One or more of the following dose reduction techniques were used: automated exposure control, adjustment of the MA and/or KV according to patient size, and use of iterative reconstruction technique.  COMPARISON: Lung cancer screening CT scans of the chest dated 10/12/2023 and 7/7/2023.  FINDINGS:  There is abnormal uptake associated with the solid peripheral component of the cavitary mass lesion in the posterior medial left lung base on functional image 145 (maximum SUV 11.8), which appears slightly progressed since the most recent 10/12/2023 study. There is no suspicious satellite nodularity.  Better seen on the current contrast-enhanced examination, there is overall increasing size of internally heterogeneous lymph nodes in the left hilum, bilateral anterior mediastinum, and the left superior mediastinum. These include the subcarinal conglomerate measuring up to 3.7 x 1.7 cm in axial cross-section (maximum SUV 8.4), previously 1.7 x 0.8 cm, and the most superiorly identified lymph node posterior to the brachiocephalic vein on image 98 measuring 1.4 x 1.0 cm (maximum SUV 4.4), previously measuring 6 x 3 mm. There are no suspicious hypermetabolic lymph nodes in the lower neck or upper abdomen.  Otherwise, uptake in the brain, kidneys, and bladder lumen appears relatively age-appropriate.  ADDITIONAL CT IMAGING FINDINGS:  There is no abnormal mass effect or focal enhancement in the brain. The ventricular system is not abnormally dilated. The orbits are symmetric without abnormal preseptal soft tissue thickening. The paranasal sinuses and mastoid air cells are well-aerated. The central skull base is intact. There are no suspicious lymph nodes in either side of the neck. There is no abnormal mucosal contour abnormality or focal nodularity in the aerodigestive tract. The major salivary glands are symmetric. The thyroid is unchanged.  There is no endobronchial soft tissue mass. Architectural changes, pleural parenchymal retractions in the lung apices, and minimal peripheral scarring in the lung bases are accentuated by respiratory motion, relatively unchanged. There is no dense consolidation or pleural effusion. There is no chest wall mass. The heart and aortopulmonary outflow tracts are unchanged in caliber.  The comparable contours of the solid organs in the upper abdomen, including the adrenal glands, are unchanged. There is mild hepatic steatosis. The gallbladder is distended with faint visualization of a peripherally calcified gallstone at the neck, without CT evidence of cholecystitis. There is no discrete pancreatic mass. The kidneys are normal in size, enhance symmetrically, and are without hydronephrosis or hydroureter. There is no bowel obstruction. The bladder is collapsed. The uterus is anteflexed. There is no suspicious adnexal mass. There are no abnormal lymph nodes in the abdomen or pelvis. There is no free fluid or free intraperitoneal air.  There are stable degenerative changes in the comparable spine. There are no discretely suspicious osteolytic or osteoblastic type lesions.  IMPRESSION:   1. Abnormal uptake associated with a suspiciously progressing centrally cavitary masslike opacity in the posterior medial left lower lobe and enlarging lymph nodes in the left hilum, bilateral anterior mediastinum, and left hilum. The constellation of findings is most concerning for primary bronchogenic malignancy with progressing regional mago disease. Pathologic confirmation is warranted.  2. No suspicious findings to suggest FDG avid malignancy outside of the chest.  3. Other incidental findings without abnormal hypermetabolism including architectural changes in the rest of the lungs, faint visualization of a radiopaque gallstone at the gallbladder neck, and mild degenerative disease. These can be reassessed at expected follow-up.   Key images provided.  DLP: 692 mGy*cm     Thank you for the opportunity to participate in the care of this patient.     Michael Miles MD  - Electronically Signed: 11- 1:50 PM  Physician to Physician Direct Line is: [phone]  ~~~~~~~~~~~~~~~~~~~~~~~~~~~~~~~~~~~~~~~~~~~~~~~~~~~~~~~~~~~~~~~~~~~~~~~~

## 2024-04-11 NOTE — ASSESSMENT
[FreeTextEntry1] : 53F PMH smoker, stage 3 SqCC of lung s/p chemotherapy (completed 02/2024), COVID-19 (2020), HTN, HLD who presents for initial pulmonary evaluation.   - Pt is seeing Dr. Franck Medeiros for LLL lobectomy. - She completed chemotherapy for squamous cell CA of lung in 02/2024 - She is willing to quit, and is actively working on it - She understands the importance of quitting - Will do trial of Anoro ellipta for likely COPD - Inhaler technique demonstrated at the bedside - Will have her return ASAP for full PFT for pre-op  - Will comment on pulmonary surgical risk at that time - Denies excessive daytime fatigue symptoms - Otherwise routine f/u in 3 mo time  The patient expressed understanding and agreement with the plan as outlined above and accepts responsibility to be compliant with any recommended testing, treatment, and follow-up visits.  All relevant questions and concerns were addressed.  45 minutes of time were spent on the encounter. Medical records were reviewed, including but not limited to hospital records, outpatient records, laboratory data, and diagnostic imaging studies. Greater than 50% of the face-to-face encounter time was spent on counseling and/or coordination of care.  Garrick Garrett MD, [facility] at [place] [address] [place] [address] T: [phone] F: [phone]

## 2024-04-11 NOTE — HISTORY OF PRESENT ILLNESS
[Current] : current [TextBox_4] : 53F PMH smoker, stage 3 SqCC of lung s/p chemotherapy (completed 02/2024), COVID-19 (2020), HTN, HLD who presents for initial pulmonary evaluation. She is seeing Dr. Franck Medeiros for LLL lobectomy. Is down to 6 cigarettes per day. She smoked up to 1.5ppd since age 17. No fevers or chills. She has occasional cough. No recent illness or hospitalizations. She does get SOB after walking 1-2 flights of stairs. Is not on inhalers at this time. [TextBox_13] : 36 [TextBox_11] : 1.5

## 2024-04-12 ENCOUNTER — APPOINTMENT (OUTPATIENT)
Dept: PULMONOLOGY | Facility: CLINIC | Age: 54
End: 2024-04-12
Payer: MEDICAID

## 2024-04-12 VITALS — BODY MASS INDEX: 31.89 KG/M2 | WEIGHT: 180 LBS | HEIGHT: 63 IN

## 2024-04-12 PROCEDURE — 85018 HEMOGLOBIN: CPT | Mod: QW

## 2024-04-12 PROCEDURE — 94010 BREATHING CAPACITY TEST: CPT

## 2024-04-12 PROCEDURE — 94729 DIFFUSING CAPACITY: CPT

## 2024-04-12 PROCEDURE — 94727 GAS DIL/WSHOT DETER LNG VOL: CPT

## 2024-05-14 ENCOUNTER — OUTPATIENT (OUTPATIENT)
Dept: OUTPATIENT SERVICES | Facility: HOSPITAL | Age: 54
LOS: 1 days | End: 2024-05-14
Payer: MEDICAID

## 2024-05-14 VITALS
DIASTOLIC BLOOD PRESSURE: 78 MMHG | OXYGEN SATURATION: 99 % | RESPIRATION RATE: 18 BRPM | WEIGHT: 180.78 LBS | TEMPERATURE: 98 F | HEIGHT: 63 IN | HEART RATE: 86 BPM | SYSTOLIC BLOOD PRESSURE: 116 MMHG

## 2024-05-14 DIAGNOSIS — Z29.9 ENCOUNTER FOR PROPHYLACTIC MEASURES, UNSPECIFIED: ICD-10-CM

## 2024-05-14 DIAGNOSIS — Z01.818 ENCOUNTER FOR OTHER PREPROCEDURAL EXAMINATION: ICD-10-CM

## 2024-05-14 DIAGNOSIS — Z98.890 OTHER SPECIFIED POSTPROCEDURAL STATES: Chronic | ICD-10-CM

## 2024-05-14 DIAGNOSIS — Z78.9 OTHER SPECIFIED HEALTH STATUS: Chronic | ICD-10-CM

## 2024-05-14 DIAGNOSIS — I10 ESSENTIAL (PRIMARY) HYPERTENSION: ICD-10-CM

## 2024-05-14 DIAGNOSIS — C34.90 MALIGNANT NEOPLASM OF UNSPECIFIED PART OF UNSPECIFIED BRONCHUS OR LUNG: ICD-10-CM

## 2024-05-14 DIAGNOSIS — Z13.89 ENCOUNTER FOR SCREENING FOR OTHER DISORDER: ICD-10-CM

## 2024-05-14 LAB
A1C WITH ESTIMATED AVERAGE GLUCOSE RESULT: 6.6 % — HIGH (ref 4–5.6)
ANION GAP SERPL CALC-SCNC: 13 MMOL/L — SIGNIFICANT CHANGE UP (ref 5–17)
APTT BLD: 29.8 SEC — SIGNIFICANT CHANGE UP (ref 24.5–35.6)
BASOPHILS # BLD AUTO: 0.06 K/UL — SIGNIFICANT CHANGE UP (ref 0–0.2)
BASOPHILS NFR BLD AUTO: 0.7 % — SIGNIFICANT CHANGE UP (ref 0–2)
BLD GP AB SCN SERPL QL: SIGNIFICANT CHANGE UP
BUN SERPL-MCNC: 10.4 MG/DL — SIGNIFICANT CHANGE UP (ref 8–20)
CALCIUM SERPL-MCNC: 9.5 MG/DL — SIGNIFICANT CHANGE UP (ref 8.4–10.5)
CHLORIDE SERPL-SCNC: 103 MMOL/L — SIGNIFICANT CHANGE UP (ref 96–108)
CO2 SERPL-SCNC: 24 MMOL/L — SIGNIFICANT CHANGE UP (ref 22–29)
CREAT SERPL-MCNC: 0.8 MG/DL — SIGNIFICANT CHANGE UP (ref 0.5–1.3)
EGFR: 89 ML/MIN/1.73M2 — SIGNIFICANT CHANGE UP
EOSINOPHIL # BLD AUTO: 0.17 K/UL — SIGNIFICANT CHANGE UP (ref 0–0.5)
EOSINOPHIL NFR BLD AUTO: 2 % — SIGNIFICANT CHANGE UP (ref 0–6)
ESTIMATED AVERAGE GLUCOSE: 143 MG/DL — HIGH (ref 68–114)
GLUCOSE SERPL-MCNC: 95 MG/DL — SIGNIFICANT CHANGE UP (ref 70–99)
HCT VFR BLD CALC: 42.1 % — SIGNIFICANT CHANGE UP (ref 34.5–45)
HGB BLD-MCNC: 14.4 G/DL — SIGNIFICANT CHANGE UP (ref 11.5–15.5)
IMM GRANULOCYTES NFR BLD AUTO: 0.4 % — SIGNIFICANT CHANGE UP (ref 0–0.9)
INR BLD: 1.04 RATIO — SIGNIFICANT CHANGE UP (ref 0.85–1.18)
LYMPHOCYTES # BLD AUTO: 2.19 K/UL — SIGNIFICANT CHANGE UP (ref 1–3.3)
LYMPHOCYTES # BLD AUTO: 26.2 % — SIGNIFICANT CHANGE UP (ref 13–44)
MCHC RBC-ENTMCNC: 31.4 PG — SIGNIFICANT CHANGE UP (ref 27–34)
MCHC RBC-ENTMCNC: 34.2 GM/DL — SIGNIFICANT CHANGE UP (ref 32–36)
MCV RBC AUTO: 91.9 FL — SIGNIFICANT CHANGE UP (ref 80–100)
MONOCYTES # BLD AUTO: 0.7 K/UL — SIGNIFICANT CHANGE UP (ref 0–0.9)
MONOCYTES NFR BLD AUTO: 8.4 % — SIGNIFICANT CHANGE UP (ref 2–14)
NEUTROPHILS # BLD AUTO: 5.21 K/UL — SIGNIFICANT CHANGE UP (ref 1.8–7.4)
NEUTROPHILS NFR BLD AUTO: 62.3 % — SIGNIFICANT CHANGE UP (ref 43–77)
PLATELET # BLD AUTO: 108 K/UL — LOW (ref 150–400)
POTASSIUM SERPL-MCNC: 4.3 MMOL/L — SIGNIFICANT CHANGE UP (ref 3.5–5.3)
POTASSIUM SERPL-SCNC: 4.3 MMOL/L — SIGNIFICANT CHANGE UP (ref 3.5–5.3)
PROTHROM AB SERPL-ACNC: 11.5 SEC — SIGNIFICANT CHANGE UP (ref 9.5–13)
RBC # BLD: 4.58 M/UL — SIGNIFICANT CHANGE UP (ref 3.8–5.2)
RBC # FLD: 12.6 % — SIGNIFICANT CHANGE UP (ref 10.3–14.5)
SODIUM SERPL-SCNC: 140 MMOL/L — SIGNIFICANT CHANGE UP (ref 135–145)
WBC # BLD: 8.36 K/UL — SIGNIFICANT CHANGE UP (ref 3.8–10.5)
WBC # FLD AUTO: 8.36 K/UL — SIGNIFICANT CHANGE UP (ref 3.8–10.5)

## 2024-05-14 PROCEDURE — 93010 ELECTROCARDIOGRAM REPORT: CPT

## 2024-05-14 NOTE — H&P PST ADULT - NSICDXPASTSURGICALHX_GEN_ALL_CORE_FT
PAST SURGICAL HISTORY:  No pertinent past surgical history      PAST SURGICAL HISTORY:  H/O ovarian cystectomy     History of bronchoscopy

## 2024-05-14 NOTE — H&P PST ADULT - ASSESSMENT
This is a       CAPRINI SCORE    AGE RELATED RISK FACTORS                                                             [ ] Age 41-60 years                                            (1 Point)  [ ] Age: 61-74 years                                           (2 Points)                 [ ] Age= 75 years                                                (3 Points)             DISEASE RELATED RISK FACTORS                                                       [ ] Edema in the lower extremities                 (1 Point)                     [ ] Varicose veins                                               (1 Point)                                 [ ] BMI > 25 Kg/m2                                            (1 Point)                                  [ ] Serious infection (ie PNA)                            (1 Point)                     [ ] Lung disease ( COPD, Emphysema)            (1 Point)                                                                          [ ] Acute myocardial infarction                         (1 Point)                  [ ] Congestive heart failure (in the previous month)  (1 Point)         [ ] Inflammatory bowel disease                            (1 Point)                  [ ] Central venous access, PICC or Port               (2 points)       (within the last month)                                                                [ ] Stroke (in the previous month)                        (5 Points)    [ ] Previous or present malignancy                       (2 points)                                                                                                                                                         HEMATOLOGY RELATED FACTORS                                                         [ ] Prior episodes of VTE                                     (3 Points)                     [ ] Positive family history for VTE                      (3 Points)                  [ ] Prothrombin 63133 A                                     (3 Points)                     [ ] Factor V Leiden                                                (3 Points)                        [ ] Lupus anticoagulants                                      (3 Points)                                                           [ ] Anticardiolipin antibodies                              (3 Points)                                                       [ ] High homocysteine in the blood                   (3 Points)                                             [ ] Other congenital or acquired thrombophilia      (3 Points)                                                [ ] Heparin induced thrombocytopenia                  (3 Points)                                        MOBILITY RELATED FACTORS  [ ] Bed rest                                                         (1 Point)  [ ] Plaster cast                                                    (2 points)  [ ] Bed bound for more than 72 hours           (2 Points)    GENDER SPECIFIC FACTORS  [ ] Pregnancy or had a baby within the last month   (1 Point)  [ ] Post-partum < 6 weeks                                   (1 Point)  [ ] Hormonal therapy  or oral contraception   (1 Point)  [ ] History of pregnancy complications              (1 point)  [ ] Unexplained or recurrent              (1 Point)    OTHER RISK FACTORS                                           (1 Point)  [ ] BMI >40, smoking, diabetes requiring insulin, chemotherapy  blood transfusions and length of surgery over 2 hours    SURGERY RELATED RISK FACTORS  [ ]  Section within the last month     (1 Point)  [ ] Minor surgery                                                  (1 Point)  [ ] Arthroscopic surgery                                       (2 Points)  [ ] Planned major surgery lasting more            (2 Points)      than 45 minutes     [ ] Elective hip or knee joint replacement       (5 points)       surgery                                                TRAUMA RELATED RISK FACTORS  [ ] Fracture of the hip, pelvis, or leg                       (5 Points)  [ ] Spinal cord injury resulting in paralysis             (5 points)       (in the previous month)    [ ] Paralysis  (less than 1 month)                             (5 Points)  [ ] Multiple Trauma within 1 month                        (5 Points)    Total Score [        ]    Caprini Score 0-2: Low Risk, NO VTE prophylaxis required for most patients, encourage ambulation  Caprini Score 3-6: Moderate Risk , pharmacologic VTE prophylaxis is indicated for most patients (in the absence of contraindications)  Caprini Score Greater than or =7: High risk, pharmocologic VTE prophylaxis indicated for most patients (in the absence of contraindications)      OPIOID RISK TOOL    LEOBARDO EACH BOX THAT APPLIES AND ADD TOTALS AT THE END    FAMILY HISTORY OF SUBSTANCE ABUSE                 FEMALE         MALE                                                Alcohol                             [  ]1 pt          [  ]3pts                                               Illegal Durgs                     [  ]2 pts        [  ]3pts                                               Rx Drugs                           [  ]4 pts        [  ]4 pts    PERSONAL HISTORY OF SUBSTANCE ABUSE                                                                                          Alcohol                             [  ]3 pts       [  ]3 pts                                               Illegal Drugs                     [  ]4 pts        [  ]4 pts                                               Rx Drugs                           [  ]5 pts        [  ]5 pts    AGE BETWEEN 16-45 YEARS                                      [  ]1 pt         [  ]1 pt    HISTORY OF PREADOLESCENT   SEXUAL ABUSE                                                             [  ]3 pts        [  ]0pts    PSYCHOLOGICAL DISEASE                     ADD, OCD, Bipolar, Schizophrenia        [  ]2 pts         [  ]2 pts                      Depression                                               [  ]1 pt           [  ]1 pt           SCORING TOTAL   (add numbers and type here)              (***)                                     A score of 3 or lower indicated LOW risk for future opioid abuse  A score of 4 to 7 indicated moderate risk for future opioid abuse  A score of 8 or higher indicates a high risk for opioid abuse                             This is a pleasant obese 53 year old  female in NAD, presenting today for PST, PMH includes HTN and high cholesterol and 2ppd x 35 year smoker, currently down to 3 cigarettes a day. Patient c/o lung nodules that are cancerous. States her PCP performed a CT chest due to her smoking history with abnormal findings. She was referred for further evaluation and a robotic bronchoscopy with biopsy on 11/15/2023 was performed. Patient diagnosed with metastatic squamous cell carcinoma 2023. She started neoadjuvant chemoimmunotherapy, which was initiated on 2023 with Dr. Fox (oncology), last treatment one month ago, reports treatments are once a month. She currently has a right chest wall port for therapy. Reports dry mouth and being treated for thrush. Reports some dyspnea with exertion at times. Reports generally feeling well. Denies chest pain, palpitations, shortness of breath, cough, dysphagia, nausea, vomiting, fevers, chills, fatigue, unintentional weight loss, or night sweats. She is now scheduled for flexible bronchoscopy, left robotic assisted lung resection, possible open with Dr. Medeiros on 24 pending medical clearance.     -------------Labs and EKG performed. Written and verbal instructions provided. Patient educated on surgical scrub, preadmission instructions, clearance and day of procedure medications, verbalizes understanding. Patient instructed to stop vitamins/supplements/herbal medications/ASA/NSAIDS for one week prior to surgery and discuss with PMD, verbalized understanding. Patient verbalized understanding of instructions and was given the opportunity to ask questions and have them answered. Out patient medications reviewed and verified with patient. Medical clearance pending.      CAPRINI SCORE    AGE RELATED RISK FACTORS                                                             [X ] Age 41-60 years                                            (1 Point)  [ ] Age: 61-74 years                                           (2 Points)                 [ ] Age= 75 years                                                (3 Points)             DISEASE RELATED RISK FACTORS                                                       [ ] Edema in the lower extremities                 (1 Point)                     [ ] Varicose veins                                               (1 Point)                                 [X ] BMI > 25 Kg/m2                                            (1 Point)                                  [ ] Serious infection (ie PNA)                            (1 Point)                     [ ] Lung disease ( COPD, Emphysema)            (1 Point)                                                                          [ ] Acute myocardial infarction                         (1 Point)                  [ ] Congestive heart failure (in the previous month)  (1 Point)         [ ] Inflammatory bowel disease                            (1 Point)                  [ ] Central venous access, PICC or Port               (2 points)       (within the last month)                                                                [ ] Stroke (in the previous month)                        (5 Points)    [X Previous or present malignancy                       (2 points)                                                                                                                                                         HEMATOLOGY RELATED FACTORS                                                         [ ] Prior episodes of VTE                                     (3 Points)                     [ ] Positive family history for VTE                      (3 Points)                  [ ] Prothrombin 50112 A                                     (3 Points)                     [ ] Factor V Leiden                                                (3 Points)                        [ ] Lupus anticoagulants                                      (3 Points)                                                           [ ] Anticardiolipin antibodies                              (3 Points)                                                       [ ] High homocysteine in the blood                   (3 Points)                                             [ ] Other congenital or acquired thrombophilia      (3 Points)                                                [ ] Heparin induced thrombocytopenia                  (3 Points)                                        MOBILITY RELATED FACTORS  [ ] Bed rest                                                         (1 Point)  [ ] Plaster cast                                                    (2 points)  [ ] Bed bound for more than 72 hours           (2 Points)    GENDER SPECIFIC FACTORS  [ ] Pregnancy or had a baby within the last month   (1 Point)  [ ] Post-partum < 6 weeks                                   (1 Point)  [ ] Hormonal therapy  or oral contraception   (1 Point)  [ ] History of pregnancy complications              (1 point)  [ ] Unexplained or recurrent              (1 Point)    OTHER RISK FACTORS                                           (1 Point)  [X ] BMI >40, smoking, diabetes requiring insulin, chemotherapy  blood transfusions and length of surgery over 2 hours    SURGERY RELATED RISK FACTORS  [ ]  Section within the last month     (1 Point)  [ ] Minor surgery                                                  (1 Point)  [ ] Arthroscopic surgery                                       (2 Points)  [ X] Planned major surgery lasting more            (2 Points)      than 45 minutes     [ ] Elective hip or knee joint replacement       (5 points)       surgery                                                TRAUMA RELATED RISK FACTORS  [ ] Fracture of the hip, pelvis, or leg                       (5 Points)  [ ] Spinal cord injury resulting in paralysis             (5 points)       (in the previous month)    [ ] Paralysis  (less than 1 month)                             (5 Points)  [ ] Multiple Trauma within 1 month                        (5 Points)    Total Score [  7      ]    Caprini Score 0-2: Low Risk, NO VTE prophylaxis required for most patients, encourage ambulation  Caprini Score 3-6: Moderate Risk , pharmacologic VTE prophylaxis is indicated for most patients (in the absence of contraindications)  Caprini Score Greater than or =7: High risk, pharmocologic VTE prophylaxis indicated for most patients (in the absence of contraindications)      OPIOID RISK TOOL    LEOBARDO EACH BOX THAT APPLIES AND ADD TOTALS AT THE END    FAMILY HISTORY OF SUBSTANCE ABUSE                 FEMALE         MALE                                                Alcohol                             [  ]1 pt          [  ]3pts                                               Illegal Durgs                     [  ]2 pts        [  ]3pts                                               Rx Drugs                           [  ]4 pts        [  ]4 pts    PERSONAL HISTORY OF SUBSTANCE ABUSE                                                                                          Alcohol                             [  ]3 pts       [  ]3 pts                                               Illegal Drugs                     [  ]4 pts        [  ]4 pts                                               Rx Drugs                           [  ]5 pts        [  ]5 pts    AGE BETWEEN 16-45 YEARS                                      [  ]1 pt         [  ]1 pt    HISTORY OF PREADOLESCENT   SEXUAL ABUSE                                                             [  ]3 pts        [  ]0pts    PSYCHOLOGICAL DISEASE                     ADD, OCD, Bipolar, Schizophrenia        [  ]2 pts         [  ]2 pts                      Depression                                               [  ]1 pt           [  ]1 pt           SCORING TOTAL   (add numbers and type here)              (**0*)                                     A score of 3 or lower indicated LOW risk for future opioid abuse  A score of 4 to 7 indicated moderate risk for future opioid abuse  A score of 8 or higher indicates a high risk for opioid abuse

## 2024-05-14 NOTE — H&P PST ADULT - PROBLEM SELECTOR PLAN 1
Scheduled for flexible bronchoscopy, left robotic assisted lung resection, possible open with Dr. Medeiros on 5/31/24 pending medical clearance.

## 2024-05-14 NOTE — H&P PST ADULT - NSICDXPASTMEDICALHX_GEN_ALL_CORE_FT
PAST MEDICAL HISTORY:  Current smoker     H/O bronchitis 4 months ago     PAST MEDICAL HISTORY:  Current smoker     H/O bronchitis 4 months ago    High cholesterol     HTN (hypertension)

## 2024-05-14 NOTE — H&P PST ADULT - HISTORY OF PRESENT ILLNESS
Patient is a 53 year old  female presenting today for PST, PMH includes   Patient recently diagnosed with metastatic squamous cell carcinoma    Treatment to date has included neoadjuvant chemoimmunotherapy, which was initiated on 12/29/2023.  53-year-old female referred by Dr. Motta who presents for consultation regarding findings of a lung nodule.   S/p robotic bronchoscopy with biopsy on 11/15/2023.   and PET scan imaging and she presents back to the office at this time to discuss results.  Her pertinent past medical history includes right ovary removal, current smoker.    Today, the patient reports that she is feeling well. She denies chest pain, palpitations, shortness of breath, cough, dysphagia, nausea/vomiting, fevers, chills, fatigue, unintentional weight loss or gain, and night sweats.  Patient is a 53 year old  female presenting today for PST, PMH includes htn and high cholesterol and 2ppd x 35 year smoker, currently down to 3 cigarettes a day. Patient c/o lung nodules that are cancerous. States her PCP performed a CT chest due to her smoking history with abnormal findings. She was referred for further evaluation and a robotic bronchoscopy with biopsy on 11/15/2023 was performed. Patient diagnosed with metastatic squamous cell carcinoma December 2023. She started neoadjuvant chemoimmunotherapy, which was initiated on 12/29/2023 with Dr. Fox (oncology), last treatment one month ago, reports treatments are once a month. She currently has a right chest wall port for therapy. Reports dry mouth and being treated for thrush. Reports some dyspnea with exertion at times. Reports generally feeling well. Denies chest pain, palpitations, shortness of breath, cough, dysphagia, nausea, vomiting, fevers, chills, fatigue, unintentional weight loss, or night sweats. She is now scheduled for flexible bronchoscopy, left robotic assisted lung resection, possible open with Dr. Medeiros on 5/31/24 pending medical clearance.  Patient is a 53 year old  female presenting today for PST, PMH includes HTN and high cholesterol and 2ppd x 35 year smoker, currently down to 3 cigarettes a day. Patient c/o lung nodules that are cancerous. States her PCP performed a CT chest due to her smoking history with abnormal findings. She was referred for further evaluation and a robotic bronchoscopy with biopsy on 11/15/2023 was performed. Patient diagnosed with metastatic squamous cell carcinoma December 2023. She started neoadjuvant chemoimmunotherapy, which was initiated on 12/29/2023 with Dr. Fox (oncology), last treatment one month ago, reports treatments are once a month. She currently has a right chest wall port for therapy. Reports dry mouth and being treated for thrush. Reports some dyspnea with exertion at times. Reports generally feeling well. Denies chest pain, palpitations, shortness of breath, cough, dysphagia, nausea, vomiting, fevers, chills, fatigue, unintentional weight loss, or night sweats. She is now scheduled for flexible bronchoscopy, left robotic assisted lung resection, possible open with Dr. Medeiros on 5/31/24 pending medical clearance.

## 2024-05-14 NOTE — H&P PST ADULT - NSICDXFAMILYHX_GEN_ALL_CORE_FT
FAMILY HISTORY:  Mother  Still living? Unknown  Family history of skin cancer, Age at diagnosis: Age Unknown    Sibling  Still living? Yes, Estimated age: Age Unknown  Family history of skin cancer, Age at diagnosis: Age Unknown     FAMILY HISTORY:  Father  Still living? Unknown  FH: diabetes mellitus, Age at diagnosis: Age Unknown  FH: heart disease, Age at diagnosis: Age Unknown    Mother  Still living? Unknown  Family history of skin cancer, Age at diagnosis: Age Unknown    Sibling  Still living? Yes, Estimated age: Age Unknown  Family history of skin cancer, Age at diagnosis: Age Unknown  FH: heart disease, Age at diagnosis: Age Unknown

## 2024-05-15 LAB
HCV AB S/CO SERPL IA: 0.19 S/CO — SIGNIFICANT CHANGE UP (ref 0–0.99)
HCV AB SERPL-IMP: SIGNIFICANT CHANGE UP

## 2024-05-23 PROBLEM — I10 ESSENTIAL (PRIMARY) HYPERTENSION: Chronic | Status: ACTIVE | Noted: 2024-05-14

## 2024-05-23 PROBLEM — E78.00 PURE HYPERCHOLESTEROLEMIA, UNSPECIFIED: Chronic | Status: ACTIVE | Noted: 2024-05-14

## 2024-05-30 ENCOUNTER — TRANSCRIPTION ENCOUNTER (OUTPATIENT)
Age: 54
End: 2024-05-30

## 2024-05-30 ENCOUNTER — APPOINTMENT (OUTPATIENT)
Dept: THORACIC SURGERY | Facility: CLINIC | Age: 54
End: 2024-05-30
Payer: MEDICAID

## 2024-05-30 VITALS
HEIGHT: 63 IN | OXYGEN SATURATION: 98 % | RESPIRATION RATE: 16 BRPM | WEIGHT: 180 LBS | DIASTOLIC BLOOD PRESSURE: 78 MMHG | SYSTOLIC BLOOD PRESSURE: 117 MMHG | BODY MASS INDEX: 31.89 KG/M2 | HEART RATE: 90 BPM

## 2024-05-30 DIAGNOSIS — C34.90 MALIGNANT NEOPLASM OF UNSPECIFIED PART OF UNSPECIFIED BRONCHUS OR LUNG: ICD-10-CM

## 2024-05-30 DIAGNOSIS — F17.200 NICOTINE DEPENDENCE, UNSPECIFIED, UNCOMPLICATED: ICD-10-CM

## 2024-05-30 PROCEDURE — 99212 OFFICE O/P EST SF 10 MIN: CPT

## 2024-05-30 RX ORDER — CHLORHEXIDINE GLUCONATE, 0.12% ORAL RINSE 1.2 MG/ML
0.12 SOLUTION DENTAL
Refills: 0 | Status: ACTIVE | COMMUNITY

## 2024-05-30 RX ORDER — CLOTRIMAZOLE 10 MG/1
10 LOZENGE ORAL
Refills: 0 | Status: ACTIVE | COMMUNITY

## 2024-05-30 NOTE — REVIEW OF SYSTEMS
[Cough] : cough [Negative] : Heme/Lymph [Feeling Poorly] : not feeling poorly [Feeling Tired] : not feeling tired [SOB on Exertion] : no shortness of breath during exertion

## 2024-05-30 NOTE — PHYSICAL EXAM
[Sclera] : the sclera and conjunctiva were normal [Neck Appearance] : the appearance of the neck was normal [Respiration, Rhythm And Depth] : normal respiratory rhythm and effort [FreeTextEntry1] : course upper lobe bilaterally [Heart Rate And Rhythm] : heart rate was normal and rhythm regular [Examination Of The Chest] : the chest was normal in appearance [Abnormal Walk] : normal gait [Skin Color & Pigmentation] : normal skin color and pigmentation [Sensation] : the sensory exam was normal to light touch and pinprick [Oriented To Time, Place, And Person] : oriented to person, place, and time [Impaired Insight] : insight and judgment were intact

## 2024-05-30 NOTE — ASSESSMENT
[FreeTextEntry1] : Ms. NORBERT MATOS is a 53-year female who presents today for follow-up. Previously, she has been followed for lung nodules which were found to be metastatic squamous cell carcinoma ([id number]) Stage IIIA, and was referred to oncology (Dr Ortiz [facility]). Treatment to date has included neoadjuvant chemoimmunotherapy, which was initiated on 12/29/2023. Additional past medical history includes right ovary removal, current smoker 4 cigarettes a day, HTN and HLD. She has been treated for oral thrush by ENT. She continues to have a dry mouth and Biotene mouth wash was recommended. At this time the oral mucosa appears clean and without any obvious irritations or lesions. She will follow up care post operatively.   PLAN: - Robotic assisted left lung resection on May 31st with Dr Medeiros  *the above was discussed with Dr Medeiros who agrees to plan

## 2024-05-30 NOTE — HISTORY OF PRESENT ILLNESS
[FreeTextEntry1] : Ms. NORBERT MATOS is a 53-year female who presents today for follow-up. Previously, she has been followed for lung nodules which were found to be metastatic squamous cell carcinoma ([id number]) Stage IIIA, and was referred to oncology (Dr Ortiz [facility]). Treatment to date has included neoadjuvant chemoimmunotherapy, which was initiated on 12/29/2023. Additional past medical history includes right ovary removal, current smoker 4 cigarettes a day, HTN and HLD.  She is scheduled for Robotic assisted left lower lobectomy with lymph node sampling with possible thoracotomy with Dr Medeiros on 5/31/24 at [facility]. She has now completed cisplatin/gemcitabine/pembrolizumab x 3 cycles between 12/29/23-02/09/24 complicated by nausea, diarrhea and thrombocytopenia.   She presents to the office today for pre-operative evaluation. She reports having quit smoking 2 days ago and has a mild cough which she reports as baseline for her. She denies any fever, chills, unintentional weight loss/gain, and night sweats.  Referring: [facility] Oncology: Dr Fox at [facility]

## 2024-05-31 ENCOUNTER — APPOINTMENT (OUTPATIENT)
Dept: THORACIC SURGERY | Facility: HOSPITAL | Age: 54
End: 2024-05-31

## 2024-05-31 ENCOUNTER — RESULT REVIEW (OUTPATIENT)
Age: 54
End: 2024-05-31

## 2024-05-31 ENCOUNTER — INPATIENT (INPATIENT)
Facility: HOSPITAL | Age: 54
LOS: 1 days | Discharge: HOME | DRG: 182 | End: 2024-06-02
Attending: THORACIC SURGERY (CARDIOTHORACIC VASCULAR SURGERY) | Admitting: THORACIC SURGERY (CARDIOTHORACIC VASCULAR SURGERY)
Payer: MEDICAID

## 2024-05-31 VITALS — HEIGHT: 63 IN | RESPIRATION RATE: 16 BRPM | WEIGHT: 180.78 LBS

## 2024-05-31 DIAGNOSIS — C34.90 MALIGNANT NEOPLASM OF UNSPECIFIED PART OF UNSPECIFIED BRONCHUS OR LUNG: ICD-10-CM

## 2024-05-31 DIAGNOSIS — Z98.890 OTHER SPECIFIED POSTPROCEDURAL STATES: Chronic | ICD-10-CM

## 2024-05-31 LAB
ABO RH CONFIRMATION: SIGNIFICANT CHANGE UP
ANION GAP SERPL CALC-SCNC: 15 MMOL/L — SIGNIFICANT CHANGE UP (ref 5–17)
BASOPHILS # BLD AUTO: 0.04 K/UL — SIGNIFICANT CHANGE UP (ref 0–0.2)
BASOPHILS NFR BLD AUTO: 0.4 % — SIGNIFICANT CHANGE UP (ref 0–2)
BUN SERPL-MCNC: 8.3 MG/DL — SIGNIFICANT CHANGE UP (ref 8–20)
CALCIUM SERPL-MCNC: 8 MG/DL — LOW (ref 8.4–10.5)
CHLORIDE SERPL-SCNC: 106 MMOL/L — SIGNIFICANT CHANGE UP (ref 96–108)
CO2 SERPL-SCNC: 19 MMOL/L — LOW (ref 22–29)
CREAT SERPL-MCNC: 0.79 MG/DL — SIGNIFICANT CHANGE UP (ref 0.5–1.3)
EGFR: 91 ML/MIN/1.73M2 — SIGNIFICANT CHANGE UP
EOSINOPHIL # BLD AUTO: 0.04 K/UL — SIGNIFICANT CHANGE UP (ref 0–0.5)
EOSINOPHIL NFR BLD AUTO: 0.4 % — SIGNIFICANT CHANGE UP (ref 0–6)
GLUCOSE BLDC GLUCOMTR-MCNC: 106 MG/DL — HIGH (ref 70–99)
GLUCOSE BLDC GLUCOMTR-MCNC: 133 MG/DL — HIGH (ref 70–99)
GLUCOSE BLDC GLUCOMTR-MCNC: 162 MG/DL — HIGH (ref 70–99)
GLUCOSE SERPL-MCNC: 150 MG/DL — HIGH (ref 70–99)
HCT VFR BLD CALC: 38.7 % — SIGNIFICANT CHANGE UP (ref 34.5–45)
HGB BLD-MCNC: 13.1 G/DL — SIGNIFICANT CHANGE UP (ref 11.5–15.5)
IMM GRANULOCYTES NFR BLD AUTO: 0.6 % — SIGNIFICANT CHANGE UP (ref 0–0.9)
LYMPHOCYTES # BLD AUTO: 0.85 K/UL — LOW (ref 1–3.3)
LYMPHOCYTES # BLD AUTO: 8 % — LOW (ref 13–44)
MAGNESIUM SERPL-MCNC: 1.8 MG/DL — SIGNIFICANT CHANGE UP (ref 1.6–2.6)
MCHC RBC-ENTMCNC: 31.3 PG — SIGNIFICANT CHANGE UP (ref 27–34)
MCHC RBC-ENTMCNC: 33.9 GM/DL — SIGNIFICANT CHANGE UP (ref 32–36)
MCV RBC AUTO: 92.6 FL — SIGNIFICANT CHANGE UP (ref 80–100)
MONOCYTES # BLD AUTO: 0.18 K/UL — SIGNIFICANT CHANGE UP (ref 0–0.9)
MONOCYTES NFR BLD AUTO: 1.7 % — LOW (ref 2–14)
NEUTROPHILS # BLD AUTO: 9.52 K/UL — HIGH (ref 1.8–7.4)
NEUTROPHILS NFR BLD AUTO: 88.9 % — HIGH (ref 43–77)
PLATELET # BLD AUTO: 90 K/UL — LOW (ref 150–400)
POTASSIUM SERPL-MCNC: 4.2 MMOL/L — SIGNIFICANT CHANGE UP (ref 3.5–5.3)
POTASSIUM SERPL-SCNC: 4.2 MMOL/L — SIGNIFICANT CHANGE UP (ref 3.5–5.3)
RBC # BLD: 4.18 M/UL — SIGNIFICANT CHANGE UP (ref 3.8–5.2)
RBC # FLD: 13 % — SIGNIFICANT CHANGE UP (ref 10.3–14.5)
SODIUM SERPL-SCNC: 139 MMOL/L — SIGNIFICANT CHANGE UP (ref 135–145)
WBC # BLD: 10.69 K/UL — HIGH (ref 3.8–10.5)
WBC # FLD AUTO: 10.69 K/UL — HIGH (ref 3.8–10.5)

## 2024-05-31 PROCEDURE — G0463: CPT

## 2024-05-31 PROCEDURE — 88309 TISSUE EXAM BY PATHOLOGIST: CPT | Mod: 26

## 2024-05-31 PROCEDURE — 71045 X-RAY EXAM CHEST 1 VIEW: CPT | Mod: 26

## 2024-05-31 PROCEDURE — 86803 HEPATITIS C AB TEST: CPT

## 2024-05-31 PROCEDURE — 32663 THORACOSCOPY W/LOBECTOMY: CPT | Mod: LT

## 2024-05-31 PROCEDURE — 93005 ELECTROCARDIOGRAM TRACING: CPT

## 2024-05-31 PROCEDURE — 86850 RBC ANTIBODY SCREEN: CPT

## 2024-05-31 PROCEDURE — 32663 THORACOSCOPY W/LOBECTOMY: CPT | Mod: AS,LT

## 2024-05-31 PROCEDURE — 85025 COMPLETE CBC W/AUTO DIFF WBC: CPT

## 2024-05-31 PROCEDURE — 86900 BLOOD TYPING SEROLOGIC ABO: CPT

## 2024-05-31 PROCEDURE — 88305 TISSUE EXAM BY PATHOLOGIST: CPT | Mod: 26

## 2024-05-31 PROCEDURE — 86923 COMPATIBILITY TEST ELECTRIC: CPT

## 2024-05-31 PROCEDURE — 83036 HEMOGLOBIN GLYCOSYLATED A1C: CPT

## 2024-05-31 PROCEDURE — S2900 ROBOTIC SURGICAL SYSTEM: CPT | Mod: NC

## 2024-05-31 PROCEDURE — 32674 THORACOSCOPY LYMPH NODE EXC: CPT

## 2024-05-31 PROCEDURE — 85610 PROTHROMBIN TIME: CPT

## 2024-05-31 PROCEDURE — 80048 BASIC METABOLIC PNL TOTAL CA: CPT

## 2024-05-31 PROCEDURE — 85730 THROMBOPLASTIN TIME PARTIAL: CPT

## 2024-05-31 PROCEDURE — 86901 BLOOD TYPING SEROLOGIC RH(D): CPT

## 2024-05-31 PROCEDURE — 32674 THORACOSCOPY LYMPH NODE EXC: CPT | Mod: AS

## 2024-05-31 PROCEDURE — 36415 COLL VENOUS BLD VENIPUNCTURE: CPT

## 2024-05-31 PROCEDURE — 31622 DX BRONCHOSCOPE/WASH: CPT | Mod: 59

## 2024-05-31 DEVICE — CHEST DRAIN THORACIC ARGYLE PVC 24FR STRAIGHT: Type: IMPLANTABLE DEVICE | Site: LEFT | Status: FUNCTIONAL

## 2024-05-31 DEVICE — XI STAPLER ENDOWRIST RELOAD 30MM WHITE: Type: IMPLANTABLE DEVICE | Site: LEFT | Status: FUNCTIONAL

## 2024-05-31 DEVICE — PROGEL PLEURAL AIR LEAK 4ML: Type: IMPLANTABLE DEVICE | Site: LEFT | Status: FUNCTIONAL

## 2024-05-31 DEVICE — XI STAPLER SUREFORM RELOAD 60 BLACK: Type: IMPLANTABLE DEVICE | Site: LEFT | Status: FUNCTIONAL

## 2024-05-31 RX ORDER — SODIUM CHLORIDE 9 MG/ML
3 INJECTION INTRAMUSCULAR; INTRAVENOUS; SUBCUTANEOUS EVERY 8 HOURS
Refills: 0 | Status: DISCONTINUED | OUTPATIENT
Start: 2024-05-31 | End: 2024-05-31

## 2024-05-31 RX ORDER — ACETAMINOPHEN 500 MG
2 TABLET ORAL
Qty: 0 | Refills: 0 | DISCHARGE

## 2024-05-31 RX ORDER — FENTANYL CITRATE 50 UG/ML
25 INJECTION INTRAVENOUS
Refills: 0 | Status: DISCONTINUED | OUTPATIENT
Start: 2024-05-31 | End: 2024-05-31

## 2024-05-31 RX ORDER — PANTOPRAZOLE SODIUM 20 MG/1
40 TABLET, DELAYED RELEASE ORAL
Refills: 0 | Status: DISCONTINUED | OUTPATIENT
Start: 2024-05-31 | End: 2024-06-02

## 2024-05-31 RX ORDER — ACETAMINOPHEN 500 MG
975 TABLET ORAL EVERY 6 HOURS
Refills: 0 | Status: DISCONTINUED | OUTPATIENT
Start: 2024-05-31 | End: 2024-05-31

## 2024-05-31 RX ORDER — FOLIC ACID 0.8 MG
1 TABLET ORAL DAILY
Refills: 0 | Status: DISCONTINUED | OUTPATIENT
Start: 2024-05-31 | End: 2024-06-02

## 2024-05-31 RX ORDER — HYDROMORPHONE HYDROCHLORIDE 2 MG/ML
0.5 INJECTION INTRAMUSCULAR; INTRAVENOUS; SUBCUTANEOUS
Refills: 0 | Status: DISCONTINUED | OUTPATIENT
Start: 2024-05-31 | End: 2024-05-31

## 2024-05-31 RX ORDER — ATORVASTATIN CALCIUM 80 MG/1
80 TABLET, FILM COATED ORAL AT BEDTIME
Refills: 0 | Status: DISCONTINUED | OUTPATIENT
Start: 2024-05-31 | End: 2024-06-02

## 2024-05-31 RX ORDER — FOLIC ACID 0.8 MG
1 TABLET ORAL
Refills: 0 | DISCHARGE

## 2024-05-31 RX ORDER — SODIUM CHLORIDE 9 MG/ML
1000 INJECTION, SOLUTION INTRAVENOUS
Refills: 0 | Status: DISCONTINUED | OUTPATIENT
Start: 2024-05-31 | End: 2024-06-02

## 2024-05-31 RX ORDER — ACETAMINOPHEN 500 MG
975 TABLET ORAL EVERY 6 HOURS
Refills: 0 | Status: COMPLETED | OUTPATIENT
Start: 2024-05-31 | End: 2024-06-02

## 2024-05-31 RX ORDER — NALOXONE HYDROCHLORIDE 4 MG/.1ML
0.1 SPRAY NASAL
Refills: 0 | Status: DISCONTINUED | OUTPATIENT
Start: 2024-05-31 | End: 2024-06-02

## 2024-05-31 RX ORDER — FENTANYL CITRATE 50 UG/ML
30 INJECTION INTRAVENOUS
Refills: 0 | Status: DISCONTINUED | OUTPATIENT
Start: 2024-05-31 | End: 2024-06-02

## 2024-05-31 RX ORDER — BUPIVACAINE 13.3 MG/ML
20 INJECTION, SUSPENSION, LIPOSOMAL INFILTRATION ONCE
Refills: 0 | Status: DISCONTINUED | OUTPATIENT
Start: 2024-05-31 | End: 2024-05-31

## 2024-05-31 RX ORDER — LOSARTAN POTASSIUM 100 MG/1
1 TABLET, FILM COATED ORAL
Refills: 0 | DISCHARGE

## 2024-05-31 RX ORDER — FLUCONAZOLE 150 MG/1
100 TABLET ORAL DAILY
Refills: 0 | Status: DISCONTINUED | OUTPATIENT
Start: 2024-05-31 | End: 2024-06-02

## 2024-05-31 RX ORDER — ONDANSETRON 8 MG/1
4 TABLET, FILM COATED ORAL EVERY 6 HOURS
Refills: 0 | Status: DISCONTINUED | OUTPATIENT
Start: 2024-05-31 | End: 2024-06-02

## 2024-05-31 RX ORDER — SENNA PLUS 8.6 MG/1
2 TABLET ORAL AT BEDTIME
Refills: 0 | Status: DISCONTINUED | OUTPATIENT
Start: 2024-05-31 | End: 2024-06-02

## 2024-05-31 RX ORDER — ROSUVASTATIN CALCIUM 5 MG/1
1 TABLET ORAL
Refills: 0 | DISCHARGE

## 2024-05-31 RX ORDER — CEFAZOLIN SODIUM 1 G
2000 VIAL (EA) INJECTION ONCE
Refills: 0 | Status: DISCONTINUED | OUTPATIENT
Start: 2024-05-31 | End: 2024-05-31

## 2024-05-31 RX ORDER — ACETAMINOPHEN 500 MG
975 TABLET ORAL ONCE
Refills: 0 | Status: COMPLETED | OUTPATIENT
Start: 2024-05-31 | End: 2024-05-31

## 2024-05-31 RX ORDER — ACETAMINOPHEN 500 MG
1000 TABLET ORAL ONCE
Refills: 0 | Status: COMPLETED | OUTPATIENT
Start: 2024-05-31 | End: 2024-05-31

## 2024-05-31 RX ORDER — HEPARIN SODIUM 5000 [USP'U]/ML
5000 INJECTION INTRAVENOUS; SUBCUTANEOUS EVERY 12 HOURS
Refills: 0 | Status: DISCONTINUED | OUTPATIENT
Start: 2024-06-01 | End: 2024-06-02

## 2024-05-31 RX ORDER — IBUPROFEN 200 MG
1 TABLET ORAL
Qty: 0 | Refills: 0 | DISCHARGE

## 2024-05-31 RX ORDER — ONDANSETRON 8 MG/1
4 TABLET, FILM COATED ORAL ONCE
Refills: 0 | Status: DISCONTINUED | OUTPATIENT
Start: 2024-05-31 | End: 2024-05-31

## 2024-05-31 RX ORDER — FLUCONAZOLE 150 MG/1
1 TABLET ORAL
Refills: 0 | DISCHARGE

## 2024-05-31 RX ORDER — LOSARTAN POTASSIUM 100 MG/1
50 TABLET, FILM COATED ORAL DAILY
Refills: 0 | Status: DISCONTINUED | OUTPATIENT
Start: 2024-05-31 | End: 2024-06-02

## 2024-05-31 RX ADMIN — FENTANYL CITRATE 30 MILLILITER(S): 50 INJECTION INTRAVENOUS at 14:11

## 2024-05-31 RX ADMIN — Medication 1000 MILLIGRAM(S): at 12:45

## 2024-05-31 RX ADMIN — FLUCONAZOLE 100 MILLIGRAM(S): 150 TABLET ORAL at 17:41

## 2024-05-31 RX ADMIN — HYDROMORPHONE HYDROCHLORIDE 0.5 MILLIGRAM(S): 2 INJECTION INTRAMUSCULAR; INTRAVENOUS; SUBCUTANEOUS at 12:47

## 2024-05-31 RX ADMIN — HYDROMORPHONE HYDROCHLORIDE 0.5 MILLIGRAM(S): 2 INJECTION INTRAMUSCULAR; INTRAVENOUS; SUBCUTANEOUS at 13:30

## 2024-05-31 RX ADMIN — Medication 1 MILLIGRAM(S): at 17:40

## 2024-05-31 RX ADMIN — FENTANYL CITRATE 30 MILLILITER(S): 50 INJECTION INTRAVENOUS at 14:38

## 2024-05-31 RX ADMIN — FENTANYL CITRATE 30 MILLILITER(S): 50 INJECTION INTRAVENOUS at 10:55

## 2024-05-31 RX ADMIN — SODIUM CHLORIDE 30 MILLILITER(S): 9 INJECTION, SOLUTION INTRAVENOUS at 14:45

## 2024-05-31 RX ADMIN — FENTANYL CITRATE 30 MILLILITER(S): 50 INJECTION INTRAVENOUS at 19:26

## 2024-05-31 RX ADMIN — ATORVASTATIN CALCIUM 80 MILLIGRAM(S): 80 TABLET, FILM COATED ORAL at 21:14

## 2024-05-31 RX ADMIN — Medication 400 MILLIGRAM(S): at 12:19

## 2024-05-31 RX ADMIN — Medication 975 MILLIGRAM(S): at 17:41

## 2024-05-31 RX ADMIN — Medication 975 MILLIGRAM(S): at 06:05

## 2024-05-31 NOTE — BRIEF OPERATIVE NOTE - NSICDXBRIEFPROCEDURE_GEN_ALL_CORE_FT
PROCEDURES:  Flexible bronchoscopy 31-May-2024 10:51:13  Samara Márquez  Lobectomy, lung, lower lobe, left, robot-assisted 31-May-2024 10:52:54  Samara Márquez  Robot-assisted dissection of mediastinal lymph nodes 31-May-2024 10:53:28  Samara Márquez  Intercostal nerve block 31-May-2024 10:54:09  Samara Márquez

## 2024-05-31 NOTE — BRIEF OPERATIVE NOTE - FIRST ASSIST PARTICIPATION DETAILS - (COMPONENTS OF THE PROCEDURE THAT ASSISTANT PARTICIPATED IN)
Mina Juarez  : 1945  Primary: Medicare Part A And B (Medicare)  Secondary: Kamala Ford @ [place] [address]  [address] [facility] [address]  Phone: [phone]  Fax: [phone] Plan Frequency: 2x/wk ofr 90 days    Plan of Care/Certification Expiration Date: 23      >PT Visit Info:  Plan Frequency: 2x/wk ofr 90 days  Plan of Care/Certification Expiration Date: 23  Total # of Visits to Date: 5  Progress Note Counter: 5      Visit Count:  5    OUTPATIENT PHYSICAL THERAPY:OP NOTE TYPE: OP Note Type: Treatment Note 9/15/2023       Episode  }Appt Desk             Treatment Diagnosis:  Other Low Back Pain (M54.59)  Abnormal posture (R29.3)  Medical/Referring Diagnosis:  Acute low back pain, unspecified back pain laterality, unspecified whether sciatica present [M54.50]  Referring Physician:  Froy Segura MD MD Orders:  PT Eval and Treat   Date of Onset:  No data recorded   Allergies:   Cortisone  Restrictions/Precautions:  No data recordedNo data recorded     Interventions Planned (Treatment may consist of any combination of the following):    Current Treatment Recommendations: Strengthening; ROM; Manual; Pain management; Home exercise program; Patient/Caregiver education & training; Modalities; Aquatics     >Subjective Comments:    \"Back is terrible. My and  and I did yardwork today\". >Initial:      5/10>Post Session:        5/10  Medications Last Reviewed:  9/15/2023  Updated Objective Findings:  None Today  Treatment   THERAPEUTIC EXERCISE: (25 minutes):    Exercises per grid below to improve mobility and strength. Required minimal verbal cues to promote proper body alignment and promote proper body posture. Progressed resistance and repetitions as indicated. MANUAL THERAPY: (20 minutes): Soft tissue mobilization was utilized and necessary because of the patient's restricted motion of soft tissue.   To L lumbar
I acted within this role throughout the entirety of the procedure performed by the primary surgeon

## 2024-06-01 DIAGNOSIS — C34.90 MALIGNANT NEOPLASM OF UNSPECIFIED PART OF UNSPECIFIED BRONCHUS OR LUNG: ICD-10-CM

## 2024-06-01 LAB
ANION GAP SERPL CALC-SCNC: 10 MMOL/L — SIGNIFICANT CHANGE UP (ref 5–17)
BASOPHILS # BLD AUTO: 0.01 K/UL — SIGNIFICANT CHANGE UP (ref 0–0.2)
BASOPHILS NFR BLD AUTO: 0.1 % — SIGNIFICANT CHANGE UP (ref 0–2)
BUN SERPL-MCNC: 8.6 MG/DL — SIGNIFICANT CHANGE UP (ref 8–20)
CALCIUM SERPL-MCNC: 8.5 MG/DL — SIGNIFICANT CHANGE UP (ref 8.4–10.5)
CHLORIDE SERPL-SCNC: 104 MMOL/L — SIGNIFICANT CHANGE UP (ref 96–108)
CO2 SERPL-SCNC: 26 MMOL/L — SIGNIFICANT CHANGE UP (ref 22–29)
CREAT SERPL-MCNC: 0.78 MG/DL — SIGNIFICANT CHANGE UP (ref 0.5–1.3)
EGFR: 92 ML/MIN/1.73M2 — SIGNIFICANT CHANGE UP
EOSINOPHIL # BLD AUTO: 0.02 K/UL — SIGNIFICANT CHANGE UP (ref 0–0.5)
EOSINOPHIL NFR BLD AUTO: 0.2 % — SIGNIFICANT CHANGE UP (ref 0–6)
GLUCOSE SERPL-MCNC: 132 MG/DL — HIGH (ref 70–99)
HCT VFR BLD CALC: 39.2 % — SIGNIFICANT CHANGE UP (ref 34.5–45)
HGB BLD-MCNC: 13.3 G/DL — SIGNIFICANT CHANGE UP (ref 11.5–15.5)
IMM GRANULOCYTES NFR BLD AUTO: 0.5 % — SIGNIFICANT CHANGE UP (ref 0–0.9)
LYMPHOCYTES # BLD AUTO: 1.29 K/UL — SIGNIFICANT CHANGE UP (ref 1–3.3)
LYMPHOCYTES # BLD AUTO: 12.3 % — LOW (ref 13–44)
MAGNESIUM SERPL-MCNC: 2.1 MG/DL — SIGNIFICANT CHANGE UP (ref 1.6–2.6)
MCHC RBC-ENTMCNC: 31.6 PG — SIGNIFICANT CHANGE UP (ref 27–34)
MCHC RBC-ENTMCNC: 33.9 GM/DL — SIGNIFICANT CHANGE UP (ref 32–36)
MCV RBC AUTO: 93.1 FL — SIGNIFICANT CHANGE UP (ref 80–100)
MONOCYTES # BLD AUTO: 0.77 K/UL — SIGNIFICANT CHANGE UP (ref 0–0.9)
MONOCYTES NFR BLD AUTO: 7.3 % — SIGNIFICANT CHANGE UP (ref 2–14)
NEUTROPHILS # BLD AUTO: 8.36 K/UL — HIGH (ref 1.8–7.4)
NEUTROPHILS NFR BLD AUTO: 79.6 % — HIGH (ref 43–77)
PLATELET # BLD AUTO: 93 K/UL — LOW (ref 150–400)
POTASSIUM SERPL-MCNC: 5 MMOL/L — SIGNIFICANT CHANGE UP (ref 3.5–5.3)
POTASSIUM SERPL-SCNC: 5 MMOL/L — SIGNIFICANT CHANGE UP (ref 3.5–5.3)
RBC # BLD: 4.21 M/UL — SIGNIFICANT CHANGE UP (ref 3.8–5.2)
RBC # FLD: 13.1 % — SIGNIFICANT CHANGE UP (ref 10.3–14.5)
SODIUM SERPL-SCNC: 140 MMOL/L — SIGNIFICANT CHANGE UP (ref 135–145)
WBC # BLD: 10.5 K/UL — SIGNIFICANT CHANGE UP (ref 3.8–10.5)
WBC # FLD AUTO: 10.5 K/UL — SIGNIFICANT CHANGE UP (ref 3.8–10.5)

## 2024-06-01 PROCEDURE — 99024 POSTOP FOLLOW-UP VISIT: CPT

## 2024-06-01 PROCEDURE — 71045 X-RAY EXAM CHEST 1 VIEW: CPT | Mod: 26

## 2024-06-01 RX ORDER — POLYETHYLENE GLYCOL 3350 17 G/17G
17 POWDER, FOR SOLUTION ORAL DAILY
Refills: 0 | Status: DISCONTINUED | OUTPATIENT
Start: 2024-06-01 | End: 2024-06-02

## 2024-06-01 RX ADMIN — PANTOPRAZOLE SODIUM 40 MILLIGRAM(S): 20 TABLET, DELAYED RELEASE ORAL at 05:20

## 2024-06-01 RX ADMIN — Medication 975 MILLIGRAM(S): at 19:57

## 2024-06-01 RX ADMIN — Medication 975 MILLIGRAM(S): at 05:20

## 2024-06-01 RX ADMIN — SODIUM CHLORIDE 30 MILLILITER(S): 9 INJECTION, SOLUTION INTRAVENOUS at 21:45

## 2024-06-01 RX ADMIN — FLUCONAZOLE 100 MILLIGRAM(S): 150 TABLET ORAL at 13:10

## 2024-06-01 RX ADMIN — Medication 975 MILLIGRAM(S): at 18:57

## 2024-06-01 RX ADMIN — Medication 975 MILLIGRAM(S): at 14:10

## 2024-06-01 RX ADMIN — LOSARTAN POTASSIUM 50 MILLIGRAM(S): 100 TABLET, FILM COATED ORAL at 05:20

## 2024-06-01 RX ADMIN — HEPARIN SODIUM 5000 UNIT(S): 5000 INJECTION INTRAVENOUS; SUBCUTANEOUS at 05:20

## 2024-06-01 RX ADMIN — FENTANYL CITRATE 30 MILLILITER(S): 50 INJECTION INTRAVENOUS at 07:39

## 2024-06-01 RX ADMIN — ATORVASTATIN CALCIUM 80 MILLIGRAM(S): 80 TABLET, FILM COATED ORAL at 21:11

## 2024-06-01 RX ADMIN — HEPARIN SODIUM 5000 UNIT(S): 5000 INJECTION INTRAVENOUS; SUBCUTANEOUS at 17:17

## 2024-06-01 RX ADMIN — Medication 975 MILLIGRAM(S): at 06:20

## 2024-06-01 RX ADMIN — Medication 1 MILLIGRAM(S): at 13:10

## 2024-06-01 RX ADMIN — Medication 975 MILLIGRAM(S): at 13:10

## 2024-06-01 RX ADMIN — FENTANYL CITRATE 30 MILLILITER(S): 50 INJECTION INTRAVENOUS at 18:56

## 2024-06-01 NOTE — PHYSICAL THERAPY INITIAL EVALUATION ADULT - GENERAL OBSERVATIONS, REHAB EVAL
Pt received seated in b/s chair (+) chest tube to LWS, (+) tele, (+) IV and PCA, NAD. Agreeable to PT evaluation

## 2024-06-01 NOTE — PHYSICAL THERAPY INITIAL EVALUATION ADULT - ORIENTATION, REHAB EVAL
Dr. Muhammad (Nephrology)  Office [phone]  Cell [phone]  [facility]  Cell [phone]      Patient is a 72y old  Female who presents with a chief complaint of sob,chest pain (27 Nov 2017 11:05)      Patient seen and examined at bedside. No chest pain/sob    VITALS:  T(F): 97.3 (12-06-17 @ 06:06), Max: 98.3 (12-05-17 @ 18:50)  HR: 78 (12-06-17 @ 09:08)  BP: 108/62 (12-06-17 @ 09:08)  RR: 18 (12-06-17 @ 09:08)  SpO2: 100% (12-06-17 @ 09:08)  Wt(kg): --    12-05 @ 07:01  -  12-06 @ 07:00  --------------------------------------------------------  IN: 240 mL / OUT: 600 mL / NET: -360 mL          PHYSICAL EXAM:  Constitutional: NAD  Neck: No JVD  Respiratory: CTAB, no wheezes, rales or rhonchi  Cardiovascular: S1, S2, RRR  Gastrointestinal: BS+, soft, NT/ND  Extremities: No peripheral edema    Hospital Medications:   MEDICATIONS  (STANDING):  acetylcysteine  Oral Solution 1200 milliGRAM(s) Oral two times a day  aspirin enteric coated 81 milliGRAM(s) Oral daily  atorvastatin 40 milliGRAM(s) Oral at bedtime  dextrose 5%. 1000 milliLiter(s) (50 mL/Hr) IV Continuous <Continuous>  dextrose 50% Injectable 12.5 Gram(s) IV Push once  dextrose 50% Injectable 25 Gram(s) IV Push once  dextrose 50% Injectable 25 Gram(s) IV Push once  docusate sodium 100 milliGRAM(s) Oral three times a day  insulin glargine Injectable (LANTUS) 65 Unit(s) SubCutaneous at bedtime  insulin lispro (HumaLOG) corrective regimen sliding scale   SubCutaneous three times a day before meals  insulin lispro (HumaLOG) corrective regimen sliding scale   SubCutaneous at bedtime  insulin lispro Injectable (HumaLOG) 24 Unit(s) SubCutaneous three times a day before meals  levothyroxine 50 MICROGram(s) Oral daily  metoprolol succinate ER 12.5 milliGRAM(s) Oral daily  montelukast 10 milliGRAM(s) Oral at bedtime  pantoprazole    Tablet 40 milliGRAM(s) Oral two times a day before meals  polyethylene glycol 3350 17 Gram(s) Oral daily  potassium chloride    Tablet ER 40 milliEquivalent(s) Oral every 4 hours  senna 2 Tablet(s) Oral at bedtime  sodium bicarbonate  Infusion 0.53 mEq/kG/Hr (200 mL/Hr) IV Continuous <Continuous>  sucralfate suspension 1 Gram(s) Oral four times a day  ticagrelor 90 milliGRAM(s) Oral two times a day      LABS:  12-06    142  |  105  |  37<H>  ----------------------------<  90  2.9<LL>   |  21<L>  |  1.54<H>    Ca    9.1      06 Dec 2017 06:00  Phos  2.9     12-06  Mg     2.0     12-06      Creatinine Trend: 1.54 <--, 1.81 <--, 1.94 <--, 2.25 <--, 2.01 <--, 2.00 <--, 2.25 <--    Phosphorus Level, Serum: 2.9 mg/dL (12-06 @ 06:00)                              10.2   11.37 )-----------( 348      ( 06 Dec 2017 06:00 )             31.1     Urine Studies:  Urinalysis - [11-25-17 @ 01:00]      Color PLYEL / Appearance CLEAR / SG 1.020 / pH 6.0      Gluc >1000 / Ketone NEGATIVE  / Bili NEGATIVE / Urobili NORMAL       Blood TRACE / Protein 150 / Leuk Est NEGATIVE / Nitrite NEGATIVE      RBC 2-5 / WBC 10-25 / Hyaline 2-5 / Gran  / Sq Epi OCC / Non Sq Epi  / Bacteria FEW      HbA1c 9.1      [11-25-17 @ 06:30]  TSH 0.79      [11-25-17 @ 06:30]  Lipid: chol 136, , HDL 37, LDL 80      [11-25-17 @ 06:30]        RADIOLOGY & ADDITIONAL STUDIES: oriented to person, place, time and situation

## 2024-06-01 NOTE — PHYSICAL THERAPY INITIAL EVALUATION ADULT - ADDITIONAL COMMENTS
Pt reports living with her children and grandchildren in a private house with 20+ steps to enter. Independent at baseline, no device. Denies owning DME. Plans to stay at her mother's house with only 5 steps. Reports her sister can assist her there if needed

## 2024-06-02 ENCOUNTER — TRANSCRIPTION ENCOUNTER (OUTPATIENT)
Age: 54
End: 2024-06-02

## 2024-06-02 VITALS
HEART RATE: 78 BPM | RESPIRATION RATE: 17 BRPM | OXYGEN SATURATION: 96 % | DIASTOLIC BLOOD PRESSURE: 79 MMHG | SYSTOLIC BLOOD PRESSURE: 138 MMHG | TEMPERATURE: 98 F

## 2024-06-02 LAB
ANION GAP SERPL CALC-SCNC: 11 MMOL/L — SIGNIFICANT CHANGE UP (ref 5–17)
BUN SERPL-MCNC: 8.8 MG/DL — SIGNIFICANT CHANGE UP (ref 8–20)
CALCIUM SERPL-MCNC: 8.6 MG/DL — SIGNIFICANT CHANGE UP (ref 8.4–10.5)
CHLORIDE SERPL-SCNC: 104 MMOL/L — SIGNIFICANT CHANGE UP (ref 96–108)
CO2 SERPL-SCNC: 24 MMOL/L — SIGNIFICANT CHANGE UP (ref 22–29)
CREAT SERPL-MCNC: 0.7 MG/DL — SIGNIFICANT CHANGE UP (ref 0.5–1.3)
EGFR: 105 ML/MIN/1.73M2 — SIGNIFICANT CHANGE UP
GLUCOSE SERPL-MCNC: 98 MG/DL — SIGNIFICANT CHANGE UP (ref 70–99)
HCT VFR BLD CALC: 37.2 % — SIGNIFICANT CHANGE UP (ref 34.5–45)
HGB BLD-MCNC: 12.7 G/DL — SIGNIFICANT CHANGE UP (ref 11.5–15.5)
MAGNESIUM SERPL-MCNC: 1.9 MG/DL — SIGNIFICANT CHANGE UP (ref 1.6–2.6)
MCHC RBC-ENTMCNC: 31.5 PG — SIGNIFICANT CHANGE UP (ref 27–34)
MCHC RBC-ENTMCNC: 34.1 GM/DL — SIGNIFICANT CHANGE UP (ref 32–36)
MCV RBC AUTO: 92.3 FL — SIGNIFICANT CHANGE UP (ref 80–100)
PLATELET # BLD AUTO: 93 K/UL — LOW (ref 150–400)
POTASSIUM SERPL-MCNC: 3.9 MMOL/L — SIGNIFICANT CHANGE UP (ref 3.5–5.3)
POTASSIUM SERPL-SCNC: 3.9 MMOL/L — SIGNIFICANT CHANGE UP (ref 3.5–5.3)
RBC # BLD: 4.03 M/UL — SIGNIFICANT CHANGE UP (ref 3.8–5.2)
RBC # FLD: 13.2 % — SIGNIFICANT CHANGE UP (ref 10.3–14.5)
SODIUM SERPL-SCNC: 139 MMOL/L — SIGNIFICANT CHANGE UP (ref 135–145)
WBC # BLD: 6.73 K/UL — SIGNIFICANT CHANGE UP (ref 3.8–10.5)
WBC # FLD AUTO: 6.73 K/UL — SIGNIFICANT CHANGE UP (ref 3.8–10.5)

## 2024-06-02 PROCEDURE — 88305 TISSUE EXAM BY PATHOLOGIST: CPT

## 2024-06-02 PROCEDURE — 85027 COMPLETE CBC AUTOMATED: CPT

## 2024-06-02 PROCEDURE — 88309 TISSUE EXAM BY PATHOLOGIST: CPT

## 2024-06-02 PROCEDURE — S2900: CPT

## 2024-06-02 PROCEDURE — C1889: CPT

## 2024-06-02 PROCEDURE — 82962 GLUCOSE BLOOD TEST: CPT

## 2024-06-02 PROCEDURE — C1729: CPT

## 2024-06-02 PROCEDURE — 83735 ASSAY OF MAGNESIUM: CPT

## 2024-06-02 PROCEDURE — 99024 POSTOP FOLLOW-UP VISIT: CPT

## 2024-06-02 PROCEDURE — 71045 X-RAY EXAM CHEST 1 VIEW: CPT

## 2024-06-02 PROCEDURE — C9399: CPT

## 2024-06-02 PROCEDURE — 71045 X-RAY EXAM CHEST 1 VIEW: CPT | Mod: 26,76

## 2024-06-02 PROCEDURE — 36415 COLL VENOUS BLD VENIPUNCTURE: CPT

## 2024-06-02 PROCEDURE — 85025 COMPLETE CBC W/AUTO DIFF WBC: CPT

## 2024-06-02 PROCEDURE — 71045 X-RAY EXAM CHEST 1 VIEW: CPT | Mod: 26,77

## 2024-06-02 PROCEDURE — 80048 BASIC METABOLIC PNL TOTAL CA: CPT

## 2024-06-02 RX ORDER — OXYCODONE AND ACETAMINOPHEN 5; 325 MG/1; MG/1
1 TABLET ORAL
Qty: 28 | Refills: 0
Start: 2024-06-02 | End: 2024-06-08

## 2024-06-02 RX ORDER — OXYCODONE HYDROCHLORIDE 5 MG/1
10 TABLET ORAL EVERY 4 HOURS
Refills: 0 | Status: DISCONTINUED | OUTPATIENT
Start: 2024-06-02 | End: 2024-06-02

## 2024-06-02 RX ORDER — ACETAMINOPHEN 500 MG
975 TABLET ORAL EVERY 6 HOURS
Refills: 0 | Status: DISCONTINUED | OUTPATIENT
Start: 2024-06-02 | End: 2024-06-02

## 2024-06-02 RX ORDER — OXYCODONE HYDROCHLORIDE 5 MG/1
5 TABLET ORAL EVERY 4 HOURS
Refills: 0 | Status: DISCONTINUED | OUTPATIENT
Start: 2024-06-02 | End: 2024-06-02

## 2024-06-02 RX ORDER — SENNA PLUS 8.6 MG/1
2 TABLET ORAL
Qty: 14 | Refills: 0
Start: 2024-06-02 | End: 2024-06-08

## 2024-06-02 RX ADMIN — PANTOPRAZOLE SODIUM 40 MILLIGRAM(S): 20 TABLET, DELAYED RELEASE ORAL at 05:47

## 2024-06-02 RX ADMIN — Medication 975 MILLIGRAM(S): at 00:35

## 2024-06-02 RX ADMIN — Medication 1 MILLIGRAM(S): at 11:24

## 2024-06-02 RX ADMIN — FENTANYL CITRATE 30 MILLILITER(S): 50 INJECTION INTRAVENOUS at 07:21

## 2024-06-02 RX ADMIN — HEPARIN SODIUM 5000 UNIT(S): 5000 INJECTION INTRAVENOUS; SUBCUTANEOUS at 05:47

## 2024-06-02 RX ADMIN — Medication 975 MILLIGRAM(S): at 12:25

## 2024-06-02 RX ADMIN — Medication 975 MILLIGRAM(S): at 01:35

## 2024-06-02 RX ADMIN — Medication 975 MILLIGRAM(S): at 06:35

## 2024-06-02 RX ADMIN — Medication 975 MILLIGRAM(S): at 11:25

## 2024-06-02 RX ADMIN — POLYETHYLENE GLYCOL 3350 17 GRAM(S): 17 POWDER, FOR SOLUTION ORAL at 11:25

## 2024-06-02 RX ADMIN — Medication 975 MILLIGRAM(S): at 05:47

## 2024-06-02 RX ADMIN — FLUCONAZOLE 100 MILLIGRAM(S): 150 TABLET ORAL at 11:25

## 2024-06-02 RX ADMIN — LOSARTAN POTASSIUM 50 MILLIGRAM(S): 100 TABLET, FILM COATED ORAL at 05:47

## 2024-06-02 NOTE — DISCHARGE NOTE PROVIDER - NSDCFUSCHEDAPPT_GEN_ALL_CORE_FT
Garrick Garrett  [facility]  P[address]  Scheduled Appointment: 07/09/2024

## 2024-06-02 NOTE — DISCHARGE NOTE PROVIDER - NSDCMRMEDTOKEN_GEN_ALL_CORE_FT
fluconazole 100 mg oral tablet: 1 tab(s) orally once a day  folic acid 1 mg oral tablet: 1 tab(s) orally once a day  losartan 50 mg oral tablet: 1 tab(s) orally once a day  rosuvastatin 40 mg oral capsule: 1 cap(s) orally once a day  senna leaf extract oral tablet: 2 tab(s) orally once a day (at bedtime) as needed for Constipation  Tylenol 325 mg oral tablet: 2 tab(s) orally every 4 hours   fluconazole 100 mg oral tablet: 1 tab(s) orally once a day  folic acid 1 mg oral tablet: 1 tab(s) orally once a day  losartan 50 mg oral tablet: 1 tab(s) orally once a day  oxycodone-acetaminophen 5 mg-325 mg oral tablet: 1 tab(s) orally every 6 hours as needed for  severe pain MDD: 4  rosuvastatin 40 mg oral capsule: 1 cap(s) orally once a day  senna leaf extract oral tablet: 2 tab(s) orally once a day (at bedtime) as needed for Constipation  Tylenol 325 mg oral tablet: 2 tab(s) orally every 4 hours

## 2024-06-02 NOTE — DISCHARGE NOTE PROVIDER - HOSPITAL COURSE
53-year female history HTN, HLD, current smoker (2 PPD x35 years), previously followed for lung nodules which were found to be metastatic squamous cell carcinoma ([id number]) Stage IIIA, and was referred to oncology (Dr Ortiz [facility]). Treatment to date has included neoadjuvant chemoimmunotherapy, which was initiated on 12/29/2023. She has now completed cisplatin/gemcitabine/pembrolizumab x 3 cycles between 12/29/23-02/09/24 complicated by nausea, diarrhea and thrombocytopenia. Patient was SDA and now s/p FB, Left Robotic assisted LLL lobectomy with Dr. Medeiros on 5/31. Post op course remains uneventful. Patient remains stable from a hemodynamic and respiratory standpoint. Patient is deemed stable for discharge home per Dr. Hendrickson (covering physician for Dr. Medeiros).

## 2024-06-02 NOTE — DISCHARGE NOTE NURSING/CASE MANAGEMENT/SOCIAL WORK - PATIENT PORTAL LINK FT
You can access the FollowMyHealth Patient Portal offered by [facility] by registering at the following website: [email or web address]. By joining [facility]’s FollowMyHealth portal, you will also be able to view your health information using other applications (apps) compatible with our system.

## 2024-06-02 NOTE — DISCHARGE NOTE PROVIDER - NSDCCPTREATMENT_GEN_ALL_CORE_FT
PRINCIPAL PROCEDURE  Procedure: Lobectomy, lung, lower lobe, left, robot-assisted  Findings and Treatment:       SECONDARY PROCEDURE  Procedure: Flexible bronchoscopy  Findings and Treatment:     Procedure: Robot-assisted dissection of mediastinal lymph nodes  Findings and Treatment:     Procedure: Intercostal nerve block  Findings and Treatment:

## 2024-06-02 NOTE — PROGRESS NOTE ADULT - PROBLEM SELECTOR PLAN 2
Continue GI ppx with Protonix and Senna  DVT ppx with HSQ and SCD boots
Continue GI ppx with Protonix and Senna  DVT ppx with HSQ and SCD boots

## 2024-06-02 NOTE — PROGRESS NOTE ADULT - SUBJECTIVE AND OBJECTIVE BOX
BRIEF HOSPITAL COURSE  53-year female history HTN, HLD, current smoker (2 PPD x35 years), previously followed for lung nodules which were found to be metastatic squamous cell carcinoma ([id number]) Stage IIIA, and was referred to oncology (Dr Diana SEARS). Treatment to date has included neoadjuvant chemoimmunotherapy, which was initiated on 12/29/2023. She has now completed cisplatin/gemcitabine/pembrolizumab x 3 cycles between 12/29/23-02/09/24 complicated by nausea, diarrhea and thrombocytopenia. Patient was SDA and now s/p FB, Left Robotic assisted LLL lobectomy with Dr. Medeiros on 5/31. Post op course remains uneventful.    SIGNIFICANT RECENT/PAST 24 HR EVENTS  No acute events reported overnight. Left chest tube remains to water seal w/o air leak. Kept in place for continued output.    SUBJECTIVE  Patient seen and examined on follow up. Pt currently lying in bed in NAD. Denies fevers, chills, HA, dizziness, CP, SOB, abd pain, N/V/D, numbness/tingling in extremities, or any other acute complaints. States pain well controlled on current regimen.   +Tolerating diet  +Using incentive as instructed  +Ambulating during day  +Passing gas  ROS negative x 10 systems except as noted above.    PAST MEDICAL & SURGICAL HISTORY  H/O bronchitis  Current smoker  HTN (hypertension)  High cholesterol  No pertinent past surgical history  H/O ovarian cystectomy  History of bronchoscopy    DAILY REVIEW  Telemetry: Sinus rhythm   Vital Signs Last 24 Hrs  T(C): 36.9 (02 Jun 2024 01:05), Max: 36.9 (01 Jun 2024 04:53)  T(F): 98.4 (02 Jun 2024 01:05), Max: 98.5 (01 Jun 2024 12:45)  HR: 76 (02 Jun 2024 01:05) (76 - 92)  BP: 144/80 (02 Jun 2024 01:05) (121/80 - 150/85)  BP(mean): --  RR: 18 (02 Jun 2024 01:05) (18 - 18)  SpO2: 92% (02 Jun 2024 01:05) (92% - 100%)    Parameters below as of 02 Jun 2024 01:05  Patient On (Oxygen Delivery Method): room air    I&O's Detail    31 May 2024 07:01  -  01 Jun 2024 01:07  --------------------------------------------------------  IN:    Lactated Ringers: 240 mL  Total IN: 240 mL    OUT:    Chest Tube (mL): 85 mL    Voided (mL): 300 mL  Total OUT: 385 mL    Total NET: -145 mL    Daily Height in cm: 160.02 (31 May 2024 05:47)    Daily   Admit Wt: Drug Dosing Weight  Height (cm): 160 (31 May 2024 05:47)  Weight (kg): 81.964 (31 May 2024 05:47)  BMI (kg/m2): 32 (31 May 2024 05:47)  BSA (m2): 1.85 (31 May 2024 05:47)    LABS                        13.1   10.69 )-----------( 90       ( 31 May 2024 10:45 )             38.7     05-31    139  |  106  |  8.3  ----------------------------<  150<H>  4.2   |  19.0<L>  |  0.79    Ca    8.0<L>      31 May 2024 10:45  Mg     1.8     05-31    POCT Blood Glucose.: 162 mg/dL (05-31-24 @ 10:38)  POCT Blood Glucose.: 133 mg/dL (05-31-24 @ 08:58)  POCT Blood Glucose.: 106 mg/dL (05-31-24 @ 06:00)    MEDICATIONS  Home Medications:  fluconazole 100 mg oral tablet: 1 tab(s) orally once a day (31 May 2024 05:47)  folic acid 1 mg oral tablet: 1 tab(s) orally once a day (31 May 2024 05:47)  losartan 50 mg oral tablet: 1 tab(s) orally once a day (31 May 2024 05:47)  rosuvastatin 40 mg oral capsule: 1 cap(s) orally once a day (31 May 2024 05:47)  Tylenol 325 mg oral tablet: 2 tab(s) orally every 4 hours (31 May 2024 05:47)    MEDICATIONS  (STANDING):  acetaminophen     Tablet .. 975 milliGRAM(s) Oral every 6 hours  atorvastatin 80 milliGRAM(s) Oral at bedtime  fentaNYL PCA (50 MICROgram(s)/mL) 30 milliLiter(s) PCA Continuous PCA Continuous  fluconAZOLE   Tablet 100 milliGRAM(s) Oral daily  folic acid 1 milliGRAM(s) Oral daily  heparin   Injectable 5000 Unit(s) SubCutaneous every 12 hours  lactated ringers. 1000 milliLiter(s) (30 mL/Hr) IV Continuous <Continuous>  losartan 50 milliGRAM(s) Oral daily  pantoprazole    Tablet 40 milliGRAM(s) Oral before breakfast    MEDICATIONS  (PRN):  naloxone Injectable 0.1 milliGRAM(s) IV Push every 3 minutes PRN For ANY of the following changes in patient status:  A. RR LESS THAN 10 breaths per minute, B. Oxygen saturation LESS THAN 90%, C. Sedation score of 6  ondansetron Injectable 4 milliGRAM(s) IV Push every 6 hours PRN Nausea  senna 2 Tablet(s) Oral at bedtime PRN Constipation    ALLERGIES  No Known Allergies    DIAGNOSTICS  All relevant and available laboratory results, radiology and medications reviewed.  Xray Chest 1 View AP/PA. (05.31.24 @ 11:21)  Left-sided chest in. No pneumothorax. No acute lung infiltrates.    IMPRESSION: No pneumothorax    PHYSICAL EXAM  Constitutional: NAD  Neck: supple, trachea midline. No JVD   Respiratory: Breath sounds diminished b/l lower fields to auscultation, no accessory muscle use noted. No wheezing, rales, or rhonchi noted b/l   Cardiovascular: Regular rate, regular rhythm, normal S1, S2; no murmurs or rub   Gastrointestinal: Soft, non-tender, non-distended, + bowel sounds   Extremities: CAIN x 4, no peripheral edema, no cyanosis, no clubbing    Vascular: Equal and normal pulses: 2+ peripheral pulses throughout  Neurological: A+O x 3; speech clear and intact; no gross sensory/motor deficits  Psychiatric: calm, normal mood, normal affect   Skin: warm, dry, well perfused, no rashes   Tubes/Lines: Lt pleural CT to water seal, serosang drainage, no obvious air leak noted  Incision: Left lateral chest incs CDI, no crepitus  
BRIEF HOSPITAL COURSE  53-year female history HTN, HLD, current smoker (2 PPD x35 years), previously followed for lung nodules which were found to be metastatic squamous cell carcinoma ([id number]) Stage IIIA, and was referred to oncology (Dr Diana SEARS). Treatment to date has included neoadjuvant chemoimmunotherapy, which was initiated on 12/29/2023. She has now completed cisplatin/gemcitabine/pembrolizumab x 3 cycles between 12/29/23-02/09/24 complicated by nausea, diarrhea and thrombocytopenia. Patient was SDA and now s/p FB, Left Robotic assisted LLL lobectomy with Dr. Medeiros on 5/31.    SIGNIFICANT RECENT/PAST 24 HR EVENTS  No acute events reported overnight. Left chest tube placed to water seal at midnight, no air leak. Mild serosang output.    SUBJECTIVE  Patient seen and examined on follow up. Pt currently lying in bed in NAD. Denies fevers, chills, HA, dizziness, CP, SOB, abd pain, N/V/D, numbness/tingling in extremities, or any other acute complaints. States pain well controlled on current regimen.   +Tolerating diet  +Using incentive as instructed  ROS negative x 10 systems except as noted above.    PAST MEDICAL & SURGICAL HISTORY  H/O bronchitis  Current smoker  HTN (hypertension)  High cholesterol  No pertinent past surgical history  H/O ovarian cystectomy  History of bronchoscopy    DAILY REVIEW  Telemetry: Sinus rhythm   Vital Signs Last 24 Hrs  T(C): 36.5 (01 Jun 2024 00:58), Max: 36.7 (31 May 2024 17:27)  T(F): 97.7 (01 Jun 2024 00:58), Max: 98 (31 May 2024 17:27)  HR: 85 (01 Jun 2024 00:58) (70 - 85)  BP: 133/80 (01 Jun 2024 00:58) (93/56 - 133/80)  BP(mean): --  RR: 18 (01 Jun 2024 00:58) (15 - 22)  SpO2: 97% (01 Jun 2024 00:58) (95% - 100%)    Parameters below as of 01 Jun 2024 00:58  Patient On (Oxygen Delivery Method): nasal cannula  O2 Flow (L/min): 3    I&O's Detail    31 May 2024 07:01  -  01 Jun 2024 01:07  --------------------------------------------------------  IN:    Lactated Ringers: 240 mL  Total IN: 240 mL    OUT:    Chest Tube (mL): 85 mL    Voided (mL): 300 mL  Total OUT: 385 mL    Total NET: -145 mL    Daily Height in cm: 160.02 (31 May 2024 05:47)    Daily   Admit Wt: Drug Dosing Weight  Height (cm): 160 (31 May 2024 05:47)  Weight (kg): 81.964 (31 May 2024 05:47)  BMI (kg/m2): 32 (31 May 2024 05:47)  BSA (m2): 1.85 (31 May 2024 05:47)    LABS                        13.1   10.69 )-----------( 90       ( 31 May 2024 10:45 )             38.7     05-31    139  |  106  |  8.3  ----------------------------<  150<H>  4.2   |  19.0<L>  |  0.79    Ca    8.0<L>      31 May 2024 10:45  Mg     1.8     05-31    POCT Blood Glucose.: 162 mg/dL (05-31-24 @ 10:38)  POCT Blood Glucose.: 133 mg/dL (05-31-24 @ 08:58)  POCT Blood Glucose.: 106 mg/dL (05-31-24 @ 06:00)    MEDICATIONS  Home Medications:  fluconazole 100 mg oral tablet: 1 tab(s) orally once a day (31 May 2024 05:47)  folic acid 1 mg oral tablet: 1 tab(s) orally once a day (31 May 2024 05:47)  losartan 50 mg oral tablet: 1 tab(s) orally once a day (31 May 2024 05:47)  rosuvastatin 40 mg oral capsule: 1 cap(s) orally once a day (31 May 2024 05:47)  Tylenol 325 mg oral tablet: 2 tab(s) orally every 4 hours (31 May 2024 05:47)    MEDICATIONS  (STANDING):  acetaminophen     Tablet .. 975 milliGRAM(s) Oral every 6 hours  atorvastatin 80 milliGRAM(s) Oral at bedtime  fentaNYL PCA (50 MICROgram(s)/mL) 30 milliLiter(s) PCA Continuous PCA Continuous  fluconAZOLE   Tablet 100 milliGRAM(s) Oral daily  folic acid 1 milliGRAM(s) Oral daily  heparin   Injectable 5000 Unit(s) SubCutaneous every 12 hours  lactated ringers. 1000 milliLiter(s) (30 mL/Hr) IV Continuous <Continuous>  losartan 50 milliGRAM(s) Oral daily  pantoprazole    Tablet 40 milliGRAM(s) Oral before breakfast    MEDICATIONS  (PRN):  naloxone Injectable 0.1 milliGRAM(s) IV Push every 3 minutes PRN For ANY of the following changes in patient status:  A. RR LESS THAN 10 breaths per minute, B. Oxygen saturation LESS THAN 90%, C. Sedation score of 6  ondansetron Injectable 4 milliGRAM(s) IV Push every 6 hours PRN Nausea  senna 2 Tablet(s) Oral at bedtime PRN Constipation    ALLERGIES  No Known Allergies    DIAGNOSTICS  All relevant and available laboratory results, radiology and medications reviewed.    PHYSICAL EXAM  Constitutional: NAD  Neck: supple, trachea midline. No JVD   Respiratory: Breath sounds diminished b/l lower fields to auscultation, no accessory muscle use noted. No wheezing, rales, or rhonchi noted b/l   Cardiovascular: Regular rate, regular rhythm, normal S1, S2; no murmurs or rub   Gastrointestinal: Soft, non-tender, non-distended, + bowel sounds   Extremities: CAIN x 4, no peripheral edema, no cyanosis, no clubbing    Vascular: Equal and normal pulses: 2+ peripheral pulses throughout  Neurological: A+O x 3; speech clear and intact; no gross sensory/motor deficits  Psychiatric: calm, normal mood, normal affect   Skin: warm, dry, well perfused, no rashes   Tubes/Lines: Lt pleural CTs to water seal, serosang drainage, no obvious air leak noted  Incision: Left lateral chest incs CDI, no crepitus

## 2024-06-02 NOTE — PROGRESS NOTE ADULT - ASSESSMENT
53-year female history HTN, HLD, current smoker (2 PPD x35 years), previously followed for lung nodules which were found to be metastatic squamous cell carcinoma ([id number]) Stage IIIA, and was [facility] Treatment to date has included neoadjuvant chemoimmunotherapy, which was initiated on 12/29/2023. She has now completed cisplatin/gemcitabine/pembrolizumab x 3 cycles between 12/29/23-02/09/24 complicated by nausea, diarrhea and thrombocytopenia. Patient was SDA and now s/p FB, Left Robotic assisted LLL lobectomy with Dr. Medeiros on 5/31. Post op course remains uneventful.
53-year female history HTN, HLD, current smoker (2 PPD x35 years), previously followed for lung nodules which were found to be metastatic squamous cell carcinoma ([id number]) Stage IIIA, and was referred to oncology (Dr Ortiz [facility]). Treatment to date has included neoadjuvant chemoimmunotherapy, which was initiated on 12/29/2023. She has now completed cisplatin/gemcitabine/pembrolizumab x 3 cycles between 12/29/23-02/09/24 complicated by nausea, diarrhea and thrombocytopenia. Patient was SDA and now s/p FB, Left Robotic assisted LLL lobectomy with Dr. Medeiros on 5/31.

## 2024-06-02 NOTE — DISCHARGE NOTE NURSING/CASE MANAGEMENT/SOCIAL WORK - NSDCFUADDAPPT_GEN_ALL_CORE_FT
Please call the thoracic surgery office phone number at [phone] tomorrow to schedule a follow up appointment with Dr. Medeiros.     Please follow up with your Primary Care Physician 1-2 weeks from discharge.

## 2024-06-02 NOTE — DISCHARGE NOTE PROVIDER - NSDCFUADDINST_GEN_ALL_CORE_FT
Please call the Cardiothoracic Surgery office at [phone] if you are experiencing any shortness of breath, chest pain, fevers or chills, drainage from the incisions, persistent nausea, vomiting or if you have any questions about your medications. If the symptoms are severe, call 911 and go to the nearest hospital.

## 2024-06-02 NOTE — DISCHARGE NOTE PROVIDER - NSDCCPCAREPLAN_GEN_ALL_CORE_FT
PRINCIPAL DISCHARGE DIAGNOSIS  Diagnosis: Malignant neoplasm of unspecified part of unspecified bronchus or lung  Assessment and Plan of Treatment: Leave dressing intact until tomorrow evening, reinforcing with tape if necessary (about 36 hours from chest tube removal). At that time you may remove the dressing and take a shower. Place clean gauze over wound if continual drainage. Call Dr. Hendrickson's office at [phone] tomorrow or the next business day to make a followup appointment. No ointments or lotions on the incision. Take all medications as ordered. Continue a stool softerner as needed. Continue to use your incentive spirometer and increase your activity as tolerated. No baths or swimming. Call the office if you experience any fevers, shortness of breath, chest pain or excessive drainage from the incision, day or night. Go to the emergency room if any of these symptoms are severe. Take your medications as ordered and take a stool softener if needed with the narcotic medications.

## 2024-06-02 NOTE — PROGRESS NOTE ADULT - PROBLEM SELECTOR PLAN 1
with mediastinal lymph node metastasis dx 12/2023  s/p LLL lobectomy on 5/31  C/w Nebs and inhalers  Left chest tube placed to water seal at MN, no air leak  Monitor output of chest tubes per shift  Follow up AM labs and AM CXR  Pain control with Fentanyl PCA while chest tube in place  Oral agents & adjuncts for pain control  Continue Bowel regimen for constipation  Encourage OOB to chair and ambulation with PT  Encourage deep breathing exercised and coughing  Chest PT and IS use with bedside nurse  , supplemental O2 prn  Plan to be discussed / reviewed with Thoracic Surgery attending / team during AM rounds.
with mediastinal lymph node metastasis dx 12/2023  s/p LLL lobectomy on 5/31  C/w Nebs and inhalers  Left chest tube to water seal, no air leak  Monitor output of chest tubes per shift - likely remove later today  Follow up AM labs and AM CXR  Pain control with Fentanyl PCA while chest tube in place  Oral agents & adjuncts for pain control  Continue Bowel regimen for constipation  Encourage OOB to chair and ambulation with PT  Encourage deep breathing exercised and coughing  Chest PT and IS use with bedside nurse  , supplemental O2 prn  Plan to be discussed / reviewed with Thoracic Surgery attending / team during AM rounds.

## 2024-06-02 NOTE — DISCHARGE NOTE PROVIDER - CARE PROVIDER_API CALL
Franck Medeiros  Thoracic Surgery  [address] [place] [id number]  Phone: [phone]  Fax: [phone]  Established Patient  Follow Up Time:

## 2024-06-05 LAB — SURGICAL PATHOLOGY STUDY: SIGNIFICANT CHANGE UP

## 2024-06-11 DIAGNOSIS — R91.8 OTHER NONSPECIFIC ABNORMAL FINDING OF LUNG FIELD: ICD-10-CM

## 2024-06-13 ENCOUNTER — APPOINTMENT (OUTPATIENT)
Dept: THORACIC SURGERY | Facility: CLINIC | Age: 54
End: 2024-06-13
Payer: MEDICAID

## 2024-06-13 ENCOUNTER — OUTPATIENT (OUTPATIENT)
Dept: OUTPATIENT SERVICES | Facility: HOSPITAL | Age: 54
LOS: 1 days | End: 2024-06-13
Payer: MEDICAID

## 2024-06-13 VITALS
HEIGHT: 63 IN | SYSTOLIC BLOOD PRESSURE: 105 MMHG | WEIGHT: 183 LBS | OXYGEN SATURATION: 97 % | RESPIRATION RATE: 16 BRPM | DIASTOLIC BLOOD PRESSURE: 67 MMHG | HEART RATE: 80 BPM | BODY MASS INDEX: 32.43 KG/M2 | TEMPERATURE: 98 F

## 2024-06-13 DIAGNOSIS — R91.1 SOLITARY PULMONARY NODULE: ICD-10-CM

## 2024-06-13 DIAGNOSIS — Z98.890 OTHER SPECIFIED POSTPROCEDURAL STATES: Chronic | ICD-10-CM

## 2024-06-13 PROCEDURE — 71046 X-RAY EXAM CHEST 2 VIEWS: CPT | Mod: 26

## 2024-06-13 PROCEDURE — 99024 POSTOP FOLLOW-UP VISIT: CPT

## 2024-06-13 PROCEDURE — 71046 X-RAY EXAM CHEST 2 VIEWS: CPT

## 2024-06-14 NOTE — ASSESSMENT
[FreeTextEntry1] : I had the pleasure of evaluating Ms. NORBERT MATOS status post left robot assisted lower lobectomy on 5/31/2024. Pathology was negative for malignancy following several months of neoadjuvant therapy.   Overall, I am happy with her progress. The incisions are healing well and pain is controlled. Continued CT scan surveillance is prudent to identify early neoplastic recurrence, so I recommend a repeat noncontrast CT chest in 3 months.  We discussed normal course of healing and recovery, which is different for everyone. The incision should stop draining within the next few days but if it continues, she was instructed to call us. She was encouraged to stay active and remain cigarette free, which she has been for several months now. Patient was counseled on the importance of smoking cessation and strategies were discussed to help her maintain a smoke-free lifestyle.   The pathology was also discussed which didn't show any cancer in the lung or lymph nodes, supporting a successful course of neoadjuvant therapy. However, continued surveillance and oncology follow up is important to assess for recurrence.   All questions answered and concerns addressed. Ms. MATOS was instructed to call our office with any further questions or concerns.   Plan: - Return to care in one month for a chest x-ray - Repeat CT scan in 3 months - Return to care following chest x-ray imaging  I, Dr. Franck Medeiros, personally performed the evaluation and management (E/M) services for this established patient who presents today with an existing condition.  That E/M includes conducting the examination, assessing all conditions, and establishing a new plan of care.  Today, Ramonita Fine PA-C, was here to observe my evaluation and management services for this/these condition(s) to be followed going forward.

## 2024-06-14 NOTE — PHYSICAL EXAM
[Respiration, Rhythm And Depth] : normal respiratory rhythm and effort [Auscultation Breath Sounds / Voice Sounds] : lungs were clear to auscultation bilaterally [Heart Rate And Rhythm] : heart rate was normal and rhythm regular [Heart Sounds] : normal S1 and S2 [Clean] : clean [Dry] : dry [Healing Well] : healing well [No Edema] : no edema [Bleeding] : no active bleeding [Foul Odor] : no foul smell [Purulent Drainage] : no purulent drainage [Serosanguinous Drainage] : no serosanguinous drainage [Erythema] : not erythematous [Warm] : not warm [Tender] : not tender [FreeTextEntry2] : scant serous drainage from site following suture removal. 5 sites otherwise c.d.i

## 2024-06-14 NOTE — PROCEDURE
[FreeTextEntry1] : [facility] Accession Number : [id number] Patient:     NORBERT MATOS   Accession:                             [id number]  Collected Date/Time:                   5/31/2024 10:00 EDT Received Date/Time:                    5/31/2024 10:44 EDT  Surgical Pathology Report - Auth (Verified)  Specimen(s) Submitted 1  Level 9 #1 2  Level 9 #2 3  Level 7 #1 4  Level 7 #2 5  Level 10 #1 6  Level 10 #2 7  Level 11 #1 8  Level 11 #2 9  Level 7 #3 10  Level 7 #4 11  Level 8 #1 12  Level 8 #2 13  Left lower lobe   Final Diagnosis 1  Level 9 #1: 1 negative lymph node. 2  Level 9 #2: 1 negative lymph node. 3  Level 7 #1: 1 negative lymph node. 4  Level 7 #2: 1 negative lymph node. 5  Level 10 #1: 1 negative lymph node. 6  Level 10 #2: 1 negative lymph node. 7  Level 11 #1: 1 negative lymph node. 8  Level 11 #2: 1 negative lymph node. 9  Level 7 #3: 1 negative lymph node. 10  Level 7 #4: 1 negative lymph node. 11  Level 8 #1: 1 negative lymph node. 12  Level 8 #2: Fibrovascular tissue, negative for dysplasia/neoplasia. 13  Left lower lobe: Lung parenchyma with 1.5 cm area of reactive/resolving pneumonitis with fresh and old hemorrhage, chronic inflammation and marked reactive fibrosis with parenchyma remodeling and few foreign body giant cell macrophages ( foreign body macrophages have light polarizable cytoplasmic inclusions ). Negative for dysplasia/neoplasia. Histologically unremarkable surgical resection margins and pleura.  Verified by: Shyam Simon MD (Electronic Signature) Reported on: 06/05/24 17:20 EDT, [facility], [address] [place] [id number] _________________________________________________________________

## 2024-07-09 ENCOUNTER — APPOINTMENT (OUTPATIENT)
Dept: PULMONOLOGY | Facility: CLINIC | Age: 54
End: 2024-07-09
Payer: MEDICAID

## 2024-07-09 VITALS
SYSTOLIC BLOOD PRESSURE: 120 MMHG | HEART RATE: 90 BPM | WEIGHT: 184 LBS | OXYGEN SATURATION: 98 % | BODY MASS INDEX: 32.6 KG/M2 | DIASTOLIC BLOOD PRESSURE: 70 MMHG | RESPIRATION RATE: 16 BRPM | HEIGHT: 63 IN

## 2024-07-09 PROCEDURE — 99214 OFFICE O/P EST MOD 30 MIN: CPT

## 2024-07-09 PROCEDURE — G2211 COMPLEX E/M VISIT ADD ON: CPT | Mod: NC

## 2024-07-11 ENCOUNTER — APPOINTMENT (OUTPATIENT)
Dept: CARDIOTHORACIC SURGERY | Facility: CLINIC | Age: 54
End: 2024-07-11
Payer: MEDICAID

## 2024-07-11 ENCOUNTER — OUTPATIENT (OUTPATIENT)
Dept: OUTPATIENT SERVICES | Facility: HOSPITAL | Age: 54
LOS: 1 days | End: 2024-07-11
Payer: MEDICAID

## 2024-07-11 VITALS
DIASTOLIC BLOOD PRESSURE: 94 MMHG | HEIGHT: 63 IN | RESPIRATION RATE: 16 BRPM | WEIGHT: 184 LBS | OXYGEN SATURATION: 98 % | HEART RATE: 98 BPM | BODY MASS INDEX: 32.6 KG/M2 | TEMPERATURE: 97.88 F | SYSTOLIC BLOOD PRESSURE: 139 MMHG

## 2024-07-11 DIAGNOSIS — R91.1 SOLITARY PULMONARY NODULE: ICD-10-CM

## 2024-07-11 DIAGNOSIS — Z98.890 OTHER SPECIFIED POSTPROCEDURAL STATES: Chronic | ICD-10-CM

## 2024-07-11 DIAGNOSIS — C34.90 MALIGNANT NEOPLASM OF UNSPECIFIED PART OF UNSPECIFIED BRONCHUS OR LUNG: ICD-10-CM

## 2024-07-11 DIAGNOSIS — F17.200 NICOTINE DEPENDENCE, UNSPECIFIED, UNCOMPLICATED: ICD-10-CM

## 2024-07-11 PROCEDURE — 71046 X-RAY EXAM CHEST 2 VIEWS: CPT

## 2024-07-11 PROCEDURE — 99024 POSTOP FOLLOW-UP VISIT: CPT

## 2024-07-11 PROCEDURE — 71046 X-RAY EXAM CHEST 2 VIEWS: CPT | Mod: 26

## 2024-07-11 RX ORDER — GABAPENTIN 300 MG
300 TABLET ORAL
Refills: 0 | Status: ACTIVE | COMMUNITY

## 2024-08-15 NOTE — REASON FOR VISIT
Doing very well with MDA. Continue  mg daily. Continue celebrex 200 mg daily.    [Spouse] : spouse [de-identified] : Flexible bronchoscopy, left robot-assisted lower lobectomy with lysis of adhesions, mediastinal lymph node dissection. [de-identified] : 5/31/2024 [de-identified] : 53-year female history HTN, HLD, current smoker (2 PPD x35 years), previously followed for lung nodules which were found to be metastatic squamous cell carcinoma ([id number]) Stage IIIA, and was referred to oncology (Dr Ortiz [facility]). Treatment to date has included neoadjuvant chemoimmunotherapy, which was initiated on 12/29/2023. She has now completed cisplatin/gemcitabine/pembrolizumab x 3 cycles between 12/29/23-02/09/24 complicated by nausea, diarrhea and thrombocytopenia. Patient was SDA and now s/p FB, Left Robotic assisted LLL lobectomy with Dr. Medeiros on 5/31. Post op course remains uneventful. Patient remains stable from a hemodynamic and respiratory standpoint. Patient is deemed stable for discharge home per Dr. Hendrickson (covering physician for Dr. Medeiros) on 6/2/2024.  Today the patient reports left upper quadrant feels like a stretching and feels uncomfortable. She reports that it is affecting her walking and feels soreness. She was leaking until sunday from a chest tube site which occurred last on sunday, which happened 8 days after surgery after a coughing fit (this occurred twice). This has resolved. Denies fevers, chills, nausea, vomiting, infection of the site.

## 2024-10-14 ENCOUNTER — APPOINTMENT (OUTPATIENT)
Dept: PULMONOLOGY | Facility: CLINIC | Age: 54
End: 2024-10-14
Payer: MEDICAID

## 2024-10-14 VITALS
WEIGHT: 182 LBS | DIASTOLIC BLOOD PRESSURE: 80 MMHG | RESPIRATION RATE: 16 BRPM | SYSTOLIC BLOOD PRESSURE: 118 MMHG | BODY MASS INDEX: 32.25 KG/M2 | HEIGHT: 63 IN | OXYGEN SATURATION: 95 % | HEART RATE: 97 BPM

## 2024-10-14 DIAGNOSIS — F17.200 NICOTINE DEPENDENCE, UNSPECIFIED, UNCOMPLICATED: ICD-10-CM

## 2024-10-14 PROCEDURE — G2211 COMPLEX E/M VISIT ADD ON: CPT | Mod: NC

## 2024-10-14 PROCEDURE — 99214 OFFICE O/P EST MOD 30 MIN: CPT

## 2024-10-14 RX ORDER — FLUTICASONE FUROATE, UMECLIDINIUM BROMIDE AND VILANTEROL TRIFENATATE 100; 62.5; 25 UG/1; UG/1; UG/1
100-62.5-25 POWDER RESPIRATORY (INHALATION)
Qty: 1 | Refills: 5 | Status: ACTIVE | COMMUNITY
Start: 2024-10-14 | End: 1900-01-01

## 2024-10-14 RX ORDER — NICOTINE 10 MG/ML
10 SPRAY, METERED NASAL
Qty: 1 | Refills: 5 | Status: ACTIVE | COMMUNITY
Start: 2024-10-14 | End: 1900-01-01

## 2025-01-13 ENCOUNTER — APPOINTMENT (OUTPATIENT)
Dept: PULMONOLOGY | Facility: CLINIC | Age: 55
End: 2025-01-13
Payer: MEDICAID

## 2025-01-13 VITALS
SYSTOLIC BLOOD PRESSURE: 118 MMHG | WEIGHT: 178 LBS | DIASTOLIC BLOOD PRESSURE: 84 MMHG | OXYGEN SATURATION: 98 % | HEART RATE: 88 BPM | HEIGHT: 62.5 IN | BODY MASS INDEX: 31.94 KG/M2 | RESPIRATION RATE: 16 BRPM

## 2025-01-13 PROCEDURE — 94010 BREATHING CAPACITY TEST: CPT

## 2025-01-13 PROCEDURE — 99213 OFFICE O/P EST LOW 20 MIN: CPT | Mod: 25

## 2025-01-13 RX ORDER — ALBUTEROL SULFATE 90 UG/1
108 (90 BASE) INHALANT RESPIRATORY (INHALATION)
Qty: 1 | Refills: 5 | Status: ACTIVE | COMMUNITY
Start: 2025-01-13 | End: 1900-01-01

## 2025-05-06 ENCOUNTER — APPOINTMENT (OUTPATIENT)
Dept: PULMONOLOGY | Facility: CLINIC | Age: 55
End: 2025-05-06

## (undated) DEVICE — ELCTR GROUNDING PAD ADULT COVIDIEN

## (undated) DEVICE — XI ARM FORCEP PROGRASP 8MM

## (undated) DEVICE — D HELP - CLEARVIEW CLEARIFY SYSTEM

## (undated) DEVICE — ENDOCATCH 10MM SPECIMEN POUCH

## (undated) DEVICE — SOL IRR POUR NS 0.9% 1000ML

## (undated) DEVICE — TUBING CONNECTING 6MM 20FT

## (undated) DEVICE — XI STAPLER ENDOWRIST 30 CURVED TIP

## (undated) DEVICE — DRSG MASTISOL

## (undated) DEVICE — UTERINE MANIPULATOR CONMED VCARE MED 34MM

## (undated) DEVICE — ION BIOPSY NEEDLE 21G

## (undated) DEVICE — VALVE BIOPSY BRONCHOVIDEOSCOPE

## (undated) DEVICE — XI ARM GRASPER TIP UP FENESTRATED

## (undated) DEVICE — VENODYNE/SCD SLEEVE CALF MEDIUM

## (undated) DEVICE — GLV 6.5 PROTEXIS (BLUE)

## (undated) DEVICE — BALLOON SINGLE FOR BF-UC160F

## (undated) DEVICE — XI TIP COVER

## (undated) DEVICE — NDL ASPIRATION VIZISHOT2 19G

## (undated) DEVICE — ION BIOPSY NEEDLE 19G

## (undated) DEVICE — TROCAR SURGIQUEST AIRSEAL 5MM X 100MM

## (undated) DEVICE — WARMING BLANKET UPPER ADULT

## (undated) DEVICE — DRAPE 3/4 SHEET 52X76"

## (undated) DEVICE — POSITIONER FOAM HEAD CRADLE (PINK)

## (undated) DEVICE — TAPE SILK 3"

## (undated) DEVICE — ION FULLY ARTICULATING CATHETER

## (undated) DEVICE — DRAPE GENERAL ENDOSCOPY

## (undated) DEVICE — SUT MONOCRYL 4-0 27" PS-2 UNDYED

## (undated) DEVICE — UTERINE MANIPULATOR COOPER SURGICAL 5MM 33CM GREEN

## (undated) DEVICE — GLV 7.5 PROTEXIS (WHITE)

## (undated) DEVICE — TRAP SPECIMEN SPUTUM 40CC

## (undated) DEVICE — ION BIOPSY NEEDLE 23G

## (undated) DEVICE — XI ENDOWRIST SUCTION IRRIGATOR 8MM

## (undated) DEVICE — XI ARM DISSECTOR CURVED BIPOLAR 8MM

## (undated) DEVICE — PACK ROBOTIC LIJ

## (undated) DEVICE — XI ENDOWRIST STAPLER SHEATH

## (undated) DEVICE — PACK ROBOTIC

## (undated) DEVICE — XI SEAL UNIVERSIAL 5-12MM

## (undated) DEVICE — VALVE SUCTION EVIS 160/200/240

## (undated) DEVICE — STOPCOCK 3 WAY W SWIVEL MALE LUER LOCK

## (undated) DEVICE — FOLEY TRAY 16FR 5CC LTX UMETER CLOSED

## (undated) DEVICE — XI ARM SCISSOR MONO CURVED

## (undated) DEVICE — POSITIONER PURPLE ARM ONE STEP (LARGE)

## (undated) DEVICE — DRAPE MAYO STAND 23"

## (undated) DEVICE — DRSG DERMABOND 0.7ML

## (undated) DEVICE — SYR ASEPTO

## (undated) DEVICE — NDL HYPO REGULAR BEVEL 25G X 1.5" (BLUE)

## (undated) DEVICE — BRUSH CYTO DISP

## (undated) DEVICE — ENDOCATCH ROBOTIC 8MM (PURPLE)

## (undated) DEVICE — XI DRAPE ARM

## (undated) DEVICE — GLV 8 PROTEXIS (WHITE)

## (undated) DEVICE — UTERINE MANIPULATOR CONMED VCARE SM 32MM

## (undated) DEVICE — ENDOCATCH THORACIC 15MM (PURPLE)

## (undated) DEVICE — CHEST DRAIN PLEUR-EVAC DRY/DRY ADULT-PEDS SINGLE (QUICK)

## (undated) DEVICE — NDL COUNTER FOAM AND MAGNET 10-20

## (undated) DEVICE — WARMING BLANKET LOWER ADULT

## (undated) DEVICE — NDL SPINAL 22G X 3.5" (BLACK)

## (undated) DEVICE — PACK BASIC GOWN MAYO COVER

## (undated) DEVICE — TUBING STRYKEFLOW II SUCTION / IRRIGATOR

## (undated) DEVICE — ION VISION PROBE ADAPTOR

## (undated) DEVICE — XI NEEDLE DRIVER LARGE

## (undated) DEVICE — XI OBTURATOR OPTICAL BLADELESS 8MM

## (undated) DEVICE — Device

## (undated) DEVICE — ELCTR BOVIE TIP BLADE INSULATED 6.5" EDGE

## (undated) DEVICE — SUT POLYSORB 0 60" TIES UNDYED

## (undated) DEVICE — ION VISION PROBE BAG

## (undated) DEVICE — SUT VICRYL 2-0 27" UR-6

## (undated) DEVICE — ELCTR BOVIE PENCIL BLADE 10FT

## (undated) DEVICE — STAPLER COVIDIEN ENDO GIA STANDARD HANDLE

## (undated) DEVICE — ION SENSOR CONNECTION CLEANER

## (undated) DEVICE — POSITIONER PINK PAD PIGAZZI SYSTEM

## (undated) DEVICE — XI ARM NEEDLE DRIVER SUTURECUT MEGA 8MM

## (undated) DEVICE — XI ARM FORCEP FENESTRATED BIPOLAR 8MM

## (undated) DEVICE — ION CATHETER GUIDE

## (undated) DEVICE — TUBING AIRSEAL TRI-LUMEN FILTERED

## (undated) DEVICE — LUBRICATING JELLY ONESHOT 1.25OZ

## (undated) DEVICE — PREP CHLORAPREP HI-LITE ORANGE 26ML

## (undated) DEVICE — DRAPE MAJOR ABDOMINAL W POUCHES

## (undated) DEVICE — FORCEP RADIAL JAW 4 PEDIATRIC W NDL 1.8MM 2MM 160CM DISP

## (undated) DEVICE — SPECIMEN CONTAINER 100ML

## (undated) DEVICE — LAPSAC SURGICAL TISSUE POUCH 8X10"

## (undated) DEVICE — XI SEAL UNIV 5- 8 MM

## (undated) DEVICE — UTERINE MANIPULATOR CONMED VCARE LG 37MM

## (undated) DEVICE — DRSG 4 X 8

## (undated) DEVICE — NDL ASPIRATION VIZISHOT2 21G

## (undated) DEVICE — PREP BETADINE SPONGE STICKS

## (undated) DEVICE — TUBING SUCTION CONN 6FT STERILE

## (undated) DEVICE — SUT POLYSORB 0 30" GS-23

## (undated) DEVICE — ENDOCATCH ROBOTIC 12MM (PURPLE)

## (undated) DEVICE — XI DRAPE COLUMN

## (undated) DEVICE — GLV 6 PROTEXIS (WHITE)

## (undated) DEVICE — TUBING IRR SET FOR CYSTOSCOPY 77"

## (undated) DEVICE — SOL INJ NS 0.9% 100ML

## (undated) DEVICE — SOL IRR POUR H2O 1000ML

## (undated) DEVICE — ION SWIVEL CONNECTOR

## (undated) DEVICE — SUCTION YANKAUER TAPERED BULBOUS NO VENT

## (undated) DEVICE — XI STAPLER SUREFORM 60

## (undated) DEVICE — XI STAPLER SUREFORM 45

## (undated) DEVICE — ION PERIPHERAL VISION PROBE

## (undated) DEVICE — GOWN XL

## (undated) DEVICE — ADAPTER BIOPSY VALVE

## (undated) DEVICE — XI CORD BIPOLAR CAUTERY (BLUE)

## (undated) DEVICE — APPLICATOR FOR PROGEL EXTENDED SPRAY TIP 29CM

## (undated) DEVICE — PACK PERI GYN

## (undated) DEVICE — SUT VICRYL 0 27" UR-6

## (undated) DEVICE — SOL IRR POUR NS 0.9% 500ML

## (undated) DEVICE — SUT PROLENE 1 30" CTX